# Patient Record
Sex: MALE | Race: WHITE | NOT HISPANIC OR LATINO | Employment: OTHER | URBAN - METROPOLITAN AREA
[De-identification: names, ages, dates, MRNs, and addresses within clinical notes are randomized per-mention and may not be internally consistent; named-entity substitution may affect disease eponyms.]

---

## 2018-12-04 ENCOUNTER — OFFICE VISIT (OUTPATIENT)
Dept: URGENT CARE | Facility: RETAIL CLINIC | Age: 33
End: 2018-12-04
Payer: COMMERCIAL

## 2018-12-04 VITALS
TEMPERATURE: 98 F | SYSTOLIC BLOOD PRESSURE: 145 MMHG | HEART RATE: 66 BPM | DIASTOLIC BLOOD PRESSURE: 74 MMHG | OXYGEN SATURATION: 98 %

## 2018-12-04 DIAGNOSIS — J02.9 ACUTE PHARYNGITIS, UNSPECIFIED ETIOLOGY: Primary | ICD-10-CM

## 2018-12-04 LAB — S PYO AG THROAT QL IA.RAPID: NORMAL

## 2018-12-04 PROCEDURE — 87081 CULTURE SCREEN ONLY: CPT | Performed by: FAMILY MEDICINE

## 2018-12-04 PROCEDURE — 87880 STREP A ASSAY W/OPTIC: CPT | Mod: QW | Performed by: FAMILY MEDICINE

## 2018-12-04 PROCEDURE — 99213 OFFICE O/P EST LOW 20 MIN: CPT | Performed by: FAMILY MEDICINE

## 2018-12-04 NOTE — PROGRESS NOTES
SUBJECTIVE:  Norma Garcia is a 33 year old male with a chief complaint of sore throat.  Onset of symptoms was 3 day(s) ago.    Course of illness: still present.  Severity moderate  Current and Associated symptoms:   Treatment measures tried include Fluids and Rest.  Predisposing factors include None.    Past Medical History:   Diagnosis Date     Psoriasis     glans     Current Outpatient Prescriptions   Medication Sig Dispense Refill     IBU-TAB TABS 200 MG OR prn (Patient not taking: Reported on 12/4/2018)       levothyroxine (SYNTHROID, LEVOTHROID) 125 MCG tablet Take 1 tablet (125 mcg) by mouth daily (Patient not taking: Reported on 12/4/2018) 90 tablet 1     levothyroxine (SYNTHROID, LEVOTHROID) 75 MCG tablet Take 1 tablet by mouth daily. (Patient not taking: Reported on 12/4/2018) 90 tablet 1     History   Smoking Status     Never Smoker   Smokeless Tobacco     Never Used       ROS:  Review of systems negative except as stated above.    OBJECTIVE:   /74  Pulse 66  Temp 98  F (36.7  C) (Oral)  SpO2 98%  GENERAL APPEARANCE: healthy, alert and no distress  EYES: EOMI,  PERRL, conjunctiva clear  HENT: ear canals and TM's normal.  Nose normal.  Pharynx erythematous with some exudate noted.  NECK: supple, non-tender to palpation, no adenopathy noted  RESP: lungs clear to auscultation - no rales, rhonchi or wheezes  CV: regular rates and rhythm, normal S1 S2, no murmur noted  ABDOMEN:  soft, nontender, no HSM or masses and bowel sounds normal  SKIN: no suspicious lesions or rashes    Rapid Strep test is negative; await throat culture results.    ASSESSMENT:  Acute pharyngitis, unspecified etiology    PLAN:   Symptomatic treat with gargles, lozenges, and OTC analgesic as needed.   Follow-up with primary care provider if not improving.

## 2018-12-04 NOTE — MR AVS SNAPSHOT
"              After Visit Summary   2018    Norma Garcia    MRN: [id number]           Patient Information     Date Of Birth          1985        Visit Information        Provider Department      2018 9:00 AM Brian Mckinnon MD [facility]        Today's Diagnoses     Acute pharyngitis, unspecified etiology    -  1       Follow-ups after your visit        Who to contact     You can reach your care team any time of the day by calling [phone].  Notification of test results:  If you have an abnormal lab result, we will notify you by phone as soon as possible.         Additional Information About Your Visit        MyChart Information     [employer] lets you send messages to your doctor, view your test results, renew your prescriptions, schedule appointments and more. To sign up, go to [email or web address] . Click on \"Log in\" on the left side of the screen, which will take you to the Welcome page. Then click on \"Sign up Now\" on the right side of the page.     You will be asked to enter the access code listed below, as well as some personal information. Please follow the directions to create your username and password.     Your access code is: [email or web address]  Expires: 3/4/2019  9:19 AM     Your access code will  in 90 days. If you need help or a new code, please call your [place] clinic or [phone].        Care EveryWhere ID     This is your Care EveryWhere ID. This could be used by other organizations to access your [facility] medical records  [id number]        Your Vitals Were     Pulse Temperature Pulse Oximetry             66 98  F (36.7  C) (Oral) 98%          Blood Pressure from Last 3 Encounters:   18 145/74   13 110/60   13 110/70    Weight from Last 3 Encounters:   13 207 lb (93.9 kg)   13 199 lb (90.3 kg)   13 198 lb (89.8 kg)              We Performed the Following     BETA STREP GROUP A R/O CULTURE     RAPID STREP SCREEN     "    Primary Care Provider Office Phone # Fax #    Jonathan Palma [phone] [address] [address]        Equal Access to Services     LAURIE YODER : Hadii aad ku hadgisel Lopez, wakaitda luqaudra, qaybta kaalmada savita, bernarda buckner odalys peralta. So [facility] [phone].    ATENCIÓN: Si habla español, tiene a peña disposición servicios gratuitos de asistencia lingüística. Llame al [phone].    We comply with applicable federal civil rights laws and [place] laws. We do not discriminate on the basis of race, color, national origin, age, disability, sex, sexual orientation, or gender identity.            Thank you!     Thank you for choosing [facility]  for your care. Our goal is always to provide you with excellent care. Hearing back from our patients is one way we can continue to improve our services. Please take a few minutes to complete the written survey that you may receive in the mail after your visit with us. Thank you!             Your Updated Medication List - Protect others around you: Learn how to safely use, store and throw away your medicines at www.disposemymeds.org.          This list is accurate as of 12/4/18  9:19 AM.  Always use your most recent med list.                   Brand Name Dispense Instructions for use Diagnosis    IBU- MG Tabs      prn        * levothyroxine 75 MCG tablet    SYNTHROID/LEVOTHROID    90 tablet    Take 1 tablet by mouth daily.    Hypothyroidism       * levothyroxine 125 MCG tablet    SYNTHROID/LEVOTHROID    90 tablet    Take 1 tablet (125 mcg) by mouth daily    Hypothyroidism       * Notice:  This list has 2 medication(s) that are the same as other medications prescribed for you. Read the directions carefully, and ask your doctor or other care provider to review them with you.

## 2018-12-06 LAB
BACTERIA SPEC CULT: NORMAL
SPECIMEN SOURCE: NORMAL

## 2019-10-19 ENCOUNTER — APPOINTMENT (OUTPATIENT)
Dept: GENERAL RADIOLOGY | Facility: CLINIC | Age: 34
End: 2019-10-19
Attending: EMERGENCY MEDICINE
Payer: COMMERCIAL

## 2019-10-19 ENCOUNTER — HOSPITAL ENCOUNTER (EMERGENCY)
Facility: CLINIC | Age: 34
Discharge: HOME | End: 2019-10-19
Attending: EMERGENCY MEDICINE | Admitting: EMERGENCY MEDICINE
Payer: COMMERCIAL

## 2019-10-19 VITALS
OXYGEN SATURATION: 97 % | DIASTOLIC BLOOD PRESSURE: 92 MMHG | TEMPERATURE: 96.5 F | RESPIRATION RATE: 17 BRPM | HEART RATE: 81 BPM | SYSTOLIC BLOOD PRESSURE: 146 MMHG

## 2019-10-19 DIAGNOSIS — E03.9 HYPOTHYROIDISM, UNSPECIFIED TYPE: ICD-10-CM

## 2019-10-19 DIAGNOSIS — R00.2 PALPITATIONS: ICD-10-CM

## 2019-10-19 LAB
ANION GAP SERPL CALCULATED.3IONS-SCNC: 8 MMOL/L (ref 3–14)
BASOPHILS # BLD AUTO: 0 10E9/L (ref 0–0.2)
BASOPHILS NFR BLD AUTO: 0.6 %
BUN SERPL-MCNC: 23 MG/DL (ref 7–30)
CALCIUM SERPL-MCNC: 8.4 MG/DL (ref 8.5–10.1)
CHLORIDE SERPL-SCNC: 104 MMOL/L (ref 94–109)
CO2 SERPL-SCNC: 25 MMOL/L (ref 20–32)
CREAT SERPL-MCNC: 1.13 MG/DL (ref 0.66–1.25)
D DIMER PPP FEU-MCNC: 0.4 UG/ML FEU (ref 0–0.5)
DIFFERENTIAL METHOD BLD: NORMAL
EOSINOPHIL NFR BLD AUTO: 3.2 %
ERYTHROCYTE [DISTWIDTH] IN BLOOD BY AUTOMATED COUNT: 12.1 % (ref 10–15)
GFR SERPL CREATININE-BSD FRML MDRD: 85 ML/MIN/{1.73_M2}
GLUCOSE SERPL-MCNC: 163 MG/DL (ref 70–99)
HCT VFR BLD AUTO: 41.1 % (ref 40–53)
HGB BLD-MCNC: 14.1 G/DL (ref 13.3–17.7)
IMM GRANULOCYTES # BLD: 0 10E9/L (ref 0–0.4)
IMM GRANULOCYTES NFR BLD: 0.4 %
LYMPHOCYTES # BLD AUTO: 2 10E9/L (ref 0.8–5.3)
LYMPHOCYTES NFR BLD AUTO: 39.4 %
MCH RBC QN AUTO: 30.7 PG (ref 26.5–33)
MCHC RBC AUTO-ENTMCNC: 34.3 G/DL (ref 31.5–36.5)
MCV RBC AUTO: 89 FL (ref 78–100)
MONOCYTES # BLD AUTO: 0.4 10E9/L (ref 0–1.3)
MONOCYTES NFR BLD AUTO: 7.9 %
NEUTROPHILS # BLD AUTO: 2.5 10E9/L (ref 1.6–8.3)
NEUTROPHILS NFR BLD AUTO: 48.5 %
NRBC # BLD AUTO: 0 10*3/UL
NRBC BLD AUTO-RTO: 0 /100
PLATELET # BLD AUTO: 264 10E9/L (ref 150–450)
POTASSIUM SERPL-SCNC: 3.4 MMOL/L (ref 3.4–5.3)
RBC # BLD AUTO: 4.6 10E12/L (ref 4.4–5.9)
SODIUM SERPL-SCNC: 137 MMOL/L (ref 133–144)
TSH SERPL DL<=0.005 MIU/L-ACNC: 21.88 MU/L (ref 0.4–4)
WBC # BLD AUTO: 5.1 10E9/L (ref 4–11)

## 2019-10-19 PROCEDURE — 80048 BASIC METABOLIC PNL TOTAL CA: CPT | Performed by: EMERGENCY MEDICINE

## 2019-10-19 PROCEDURE — 71046 X-RAY EXAM CHEST 2 VIEWS: CPT | Mod: TC

## 2019-10-19 PROCEDURE — 93010 ELECTROCARDIOGRAM REPORT: CPT | Mod: Z6 | Performed by: EMERGENCY MEDICINE

## 2019-10-19 PROCEDURE — 85025 COMPLETE CBC W/AUTO DIFF WBC: CPT | Performed by: EMERGENCY MEDICINE

## 2019-10-19 PROCEDURE — 99285 EMERGENCY DEPT VISIT HI MDM: CPT | Mod: 25 | Performed by: EMERGENCY MEDICINE

## 2019-10-19 PROCEDURE — 93005 ELECTROCARDIOGRAM TRACING: CPT | Performed by: EMERGENCY MEDICINE

## 2019-10-19 PROCEDURE — 85379 FIBRIN DEGRADATION QUANT: CPT | Performed by: EMERGENCY MEDICINE

## 2019-10-19 PROCEDURE — 84443 ASSAY THYROID STIM HORMONE: CPT | Performed by: EMERGENCY MEDICINE

## 2019-10-19 NOTE — ED AVS SNAPSHOT
[facility] Emergency Department  [address] [place] [address]  Phone:  [phone]  Fax:  [phone]                                    Norma Garcia   MRN: [id number]    Department:  [facility] Emergency Department   Date of Visit:  10/19/2019           After Visit Summary Signature Page    I have received my discharge instructions, and my questions have been answered. I have discussed any challenges I see with this plan with the nurse or doctor.    ..........................................................................................................................................  Patient/Patient Representative Signature      ..........................................................................................................................................  Patient Representative Print Name and Relationship to Patient    ..................................................               ................................................  Date                                   Time    ..........................................................................................................................................  Reviewed by Signature/Title    ...................................................              ..............................................  Date                                               Time          22EPIC Rev 08/18

## 2019-10-19 NOTE — ED TRIAGE NOTES
"Here with shortness of breath and complaints of palpitations. States \"It might be the caffeine\" reports having several cups of coffee this AM. Symptoms started about 45 minutes PTA.  "

## 2019-10-19 NOTE — ED NOTES
PT here with mom.  Pt states that he is generally healthy except he was on thyroid medication some time ago and stopped on his own. Today he noted a feeling that his heart was racing and was SOB.  Never has had this b/4.  Denies any n/v/f/c/diaphoresis.  Denies use of energy drinks.  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

## 2019-10-19 NOTE — ED PROVIDER NOTES
History     Chief Complaint   Patient presents with     Shortness of Breath     HPI  Norma Garcia is a 34 year old male who presents with dyspnea.  This began this morning.  It has been intermittent.  He is also noticed he felt like his heart was racing, he is felt hot and lightheaded at times.  He has had quite a bit of coffee this morning he states.  He had maybe 5 to 6 cups.  Normal day for him is 0 to 3 cups.  He said no fever.  No cough.  No history of heart or lung disease.  With his heart racing he did not check his pulse per se.    Allergies:  Allergies   Allergen Reactions     No Known Drug Allergies        Problem List:    Patient Active Problem List    Diagnosis Date Noted     Hypothyroidism 04/03/2013     Priority: Medium     Psoriasis 03/30/2012     Priority: Medium     CARDIOVASCULAR SCREENING; LDL GOAL LESS THAN 160 10/31/2010     Priority: Medium        Past Medical History:    Past Medical History:   Diagnosis Date     Psoriasis      Thyroid disease        Past Surgical History:    History reviewed. No pertinent surgical history.    Family History:    Family History   Problem Relation Age of Onset     Neurologic Disorder Paternal Grandmother         migraine headaches       Social History:  Marital Status:  Single [1]  Social History     Tobacco Use     Smoking status: Never Smoker     Smokeless tobacco: Never Used   Substance Use Topics     Alcohol use: Yes     Comment: rare     Drug use: No        Medications:    IBU-TAB TABS 200 MG OR  levothyroxine (SYNTHROID, LEVOTHROID) 125 MCG tablet  levothyroxine (SYNTHROID, LEVOTHROID) 75 MCG tablet          Review of Systems  All other systems are reviewed and are negative    Physical Exam   BP: (!) 170/90  Pulse: 81  Heart Rate: 93  Temp: 96.5  F (35.8  C)  Resp: 14  SpO2: 97 %      Physical Exam  Vitals signs and nursing note reviewed.   Constitutional:       General: He is not in acute distress.     Appearance: He is well-developed. He is not  diaphoretic.   HENT:      Head: Normocephalic and atraumatic.   Eyes:      General: No scleral icterus.        Right eye: No discharge.         Left eye: No discharge.      Conjunctiva/sclera: Conjunctivae normal.   Neck:      Musculoskeletal: Normal range of motion and neck supple.   Cardiovascular:      Rate and Rhythm: Normal rate and regular rhythm.      Heart sounds: Normal heart sounds. No murmur.   Pulmonary:      Effort: Pulmonary effort is normal. No respiratory distress.      Breath sounds: Normal breath sounds. No stridor.   Abdominal:      Palpations: Abdomen is soft.      Tenderness: There is no tenderness.   Musculoskeletal: Normal range of motion.   Skin:     General: Skin is warm and dry.      Coloration: Skin is not pale.      Findings: No erythema or rash.   Neurological:      Mental Status: He is alert.      Cranial Nerves: No cranial nerve deficit.      Motor: No abnormal muscle tone.         ED Course        Procedures            EKG: Normal sinus rhythm, normal axis.  Rate of 80 beats per minute.  No acute ST or T wave changes.  Interpreted by myself.      Critical Care time:  none               Results for orders placed or performed during the hospital encounter of 10/19/19 (from the past 24 hour(s))   CBC with platelets differential   Result Value Ref Range    WBC 5.1 4.0 - 11.0 10e9/L    RBC Count 4.60 4.4 - 5.9 10e12/L    Hemoglobin 14.1 13.3 - 17.7 g/dL    Hematocrit 41.1 40.0 - 53.0 %    MCV 89 78 - 100 fl    MCH 30.7 26.5 - 33.0 pg    MCHC 34.3 31.5 - 36.5 g/dL    RDW 12.1 10.0 - 15.0 %    Platelet Count 264 150 - 450 10e9/L    Diff Method Automated Method     % Neutrophils 48.5 %    % Lymphocytes 39.4 %    % Monocytes 7.9 %    % Eosinophils 3.2 %    % Basophils 0.6 %    % Immature Granulocytes 0.4 %    Nucleated RBCs 0 0 /100    Absolute Neutrophil 2.5 1.6 - 8.3 10e9/L    Absolute Lymphocytes 2.0 0.8 - 5.3 10e9/L    Absolute Monocytes 0.4 0.0 - 1.3 10e9/L    Absolute Basophils 0.0 0.0 -  0.2 10e9/L    Abs Immature Granulocytes 0.0 0 - 0.4 10e9/L    Absolute Nucleated RBC 0.0    D dimer quantitative   Result Value Ref Range    D Dimer 0.4 0.0 - 0.50 ug/ml FEU   TSH   Result Value Ref Range    TSH 21.88 (H) 0.40 - 4.00 mU/L   Basic metabolic panel   Result Value Ref Range    Sodium 137 133 - 144 mmol/L    Potassium 3.4 3.4 - 5.3 mmol/L    Chloride 104 94 - 109 mmol/L    Carbon Dioxide 25 20 - 32 mmol/L    Anion Gap 8 3 - 14 mmol/L    Glucose 163 (H) 70 - 99 mg/dL    Urea Nitrogen 23 7 - 30 mg/dL    Creatinine 1.13 0.66 - 1.25 mg/dL    GFR Estimate 85 >60 mL/min/[1.73_m2]    GFR Estimate If Black >90 >60 mL/min/[1.73_m2]    Calcium 8.4 (L) 8.5 - 10.1 mg/dL   XR Chest 2 Views    Narrative    CHEST TWO VIEWS  10/19/2019 2:04 PM     HISTORY: Shortness of breath.    COMPARISON: Chest x-ray 8/26/2010.      Impression    IMPRESSION: PA and lateral views of the chest. Lungs are clear. Heart  is normal in size. No effusions are evident. No pneumothorax.    JOEL BARNES MD       Medications - No data to display    Assessments & Plan (with Medical Decision Making)  34-year-old male who presented with palpitations and dyspnea after taking an approximately 5 to 6 cups of coffee which is a large amount for him.  Work-up for this as above was benign.  Incidental finding of elevated TSH.  Previous elevated TSH in the range of 8 but has not been taking his medications.  Have referred him back to his primary physician and reinforced treating this would be highly recommended.     I have reviewed the nursing notes.    I have reviewed the findings, diagnosis, plan and need for follow up with the patient.       New Prescriptions    No medications on file       Final diagnoses:   Palpitations   Hypothyroidism, unspecified type       10/19/2019   [facility] EMERGENCY DEPARTMENT     Pepe Bates MD  10/19/19 [id number]

## 2019-10-23 NOTE — PROGRESS NOTES
"Subjective     Norma Garcia is a 34 year old male who presents to clinic today for the following health issues:    History of Present Illness        Hypothyroidism:     Since last visit, patient describes the following symptoms::  Dry skin    Migraines:   Since the patient's last clinic visit, headaches are: no change  The patient is getting headaches:  1  He is able to do normal daily activities when he has a migraine.  The patient is taking the following rescue/relief medications:  Ibuprofen (Advil, Motrin)   Patient states \"I get total relief\" from the rescue/relief medications.   The patient is taking the following medications to prevent migraines:  No medications to prevent migraines  In the past 4 weeks, the patient has gone to an Urgent Care or Emergency Room 0 times times due to headaches.    He eats 0-1 servings of fruits and vegetables daily.He consumes 0 sweetened beverage(s) daily.     ED/UC Followup:    Facility:  [facility]  Date of visit: 10/19/19  Reason for visit: palpitations, shortness of breath  Current Status: still having shortness of breath    Patient presents today after being seen in the ER on 10/19/2019. At that time he presented with shortness of breath and heart racing. He did drink maybe 5-6 cups of coffee that day when he normally has just a couple. Lab evaluation done showing a TSH of 21.88. D-dimer was negative and chest xray normal.     Patient reports he was diagnosed with hypothyroidism in 2013. At that time his TSH was 8. He was started on Synthroid. Stopped this after some time and has not been seen since. He reports shortness of breath is still persisting. He reports especially worse when he is talking a lot or when he is working. He reports symptoms do improve with rest or with taking a drink of water. He reports he has not had any heart palpitations since the ER visit. No symptoms of shortness of breath at night. Reports very hard to describe the shortness of breath. He is a " " for farm equipment - very physical job. Thinks maybe mother has thyroid problems.      Patient Active Problem List   Diagnosis     CARDIOVASCULAR SCREENING; LDL GOAL LESS THAN 160     Psoriasis     Hypothyroidism     History reviewed. No pertinent surgical history.    Social History     Tobacco Use     Smoking status: Never Smoker     Smokeless tobacco: Never Used   Substance Use Topics     Alcohol use: Yes     Comment: rare     Family History   Problem Relation Age of Onset     Neurologic Disorder Paternal Grandmother         migraine headaches     Diabetes Maternal Grandmother          Current Outpatient Medications   Medication Sig Dispense Refill     levothyroxine (SYNTHROID/LEVOTHROID) 125 MCG tablet Take 1 tablet (125 mcg) by mouth daily 90 tablet 0     Allergies   Allergen Reactions     No Known Drug Allergies      BP Readings from Last 3 Encounters:   10/25/19 106/70   10/19/19 (!) 146/92   12/04/18 145/74    Wt Readings from Last 3 Encounters:   10/25/19 102.5 kg (226 lb)   08/30/13 93.9 kg (207 lb)   06/03/13 90.3 kg (199 lb)        Reviewed and updated as needed this visit by Provider  Tobacco  Allergies  Meds  Problems  Med Hx  Surg Hx  Fam Hx  Soc Hx          Review of Systems   ROS COMP: Constitutional, HEENT, cardiovascular, pulmonary, GI, , musculoskeletal, neuro, skin, endocrine and psych systems are negative, except as otherwise noted.      Objective    /70   Pulse 60   Temp 98.1  F (36.7  C) (Temporal)   Resp 16   Ht 1.899 m (6' 2.75\")   Wt 102.5 kg (226 lb)   SpO2 99%   BMI 28.44 kg/m    Body mass index is 28.44 kg/m .  Physical Exam   GENERAL: healthy, alert and no distress  EYES: Eyes grossly normal to inspection, PERRL and conjunctivae and sclerae normal  HENT: ear canals and TM's normal, nose and mouth without ulcers or lesions  NECK: no adenopathy, no asymmetry, masses, or scars and thyroid normal to palpation  RESP: lungs clear to auscultation - no rales, " rhonchi or wheezes  CV: regular rate and rhythm, normal S1 S2, no S3 or S4, no murmur, click or rub, no peripheral edema and peripheral pulses strong  ABDOMEN: soft, nontender, no hepatosplenomegaly, no masses and bowel sounds normal  SKIN: no suspicious lesions or rashes  PSYCH: mentation appears normal, affect normal/bright    Diagnostic Test Results:  Labs reviewed in Epic  none         Assessment & Plan     1. Hypothyroidism, unspecified type  Discussed with patient that not uncommon for thyroid symptoms to produce heart palpitations, shortness of breath among other symptoms. Patient was last on a dose of 125 mcg so will restart this today. Discussed appropriate timing of taking the medication and importance of taking this daily. Will have patient follow-up in 4-6 weeks for a lab only appointment, sooner if needed.   - levothyroxine (SYNTHROID/LEVOTHROID) 125 MCG tablet; Take 1 tablet (125 mcg) by mouth daily  Dispense: 90 tablet; Refill: 0  - TSH with free T4 reflex; Future     The patient indicates understanding of these issues and agrees with the plan.    Carolyn Dudley PA-C  [facility]

## 2019-10-25 ENCOUNTER — OFFICE VISIT (OUTPATIENT)
Dept: FAMILY MEDICINE | Facility: OTHER | Age: 34
End: 2019-10-25
Payer: COMMERCIAL

## 2019-10-25 VITALS
SYSTOLIC BLOOD PRESSURE: 106 MMHG | WEIGHT: 226 LBS | RESPIRATION RATE: 16 BRPM | BODY MASS INDEX: 28.1 KG/M2 | DIASTOLIC BLOOD PRESSURE: 70 MMHG | HEIGHT: 75 IN | HEART RATE: 60 BPM | OXYGEN SATURATION: 99 % | TEMPERATURE: 98.1 F

## 2019-10-25 DIAGNOSIS — E03.9 HYPOTHYROIDISM, UNSPECIFIED TYPE: ICD-10-CM

## 2019-10-25 PROCEDURE — 99203 OFFICE O/P NEW LOW 30 MIN: CPT | Performed by: PHYSICIAN ASSISTANT

## 2019-10-25 RX ORDER — LEVOTHYROXINE SODIUM 125 UG/1
125 TABLET ORAL DAILY
Qty: 90 TABLET | Refills: 0 | Status: SHIPPED | OUTPATIENT
Start: 2019-10-25 | End: 2019-12-30 | Stop reason: SINTOL

## 2019-10-25 ASSESSMENT — MIFFLIN-ST. JEOR: SCORE: 2056.79

## 2019-10-25 NOTE — PATIENT INSTRUCTIONS
Patient Education     Hypothyroidism    You have hypothyroidism. This means your thyroid gland is not making enough thyroid hormone. This hormone is vital to body growth and metabolism. If you don t make enough, many body processes slow down. This can cause symptoms throughout the body. Hypothyroidism can range from mild to severe. The most severe form is called myxedema.  There are a number of causes of hypothyroidism. A common cause is Hashimoto s disease. This disease causes the body s own immune system to attack the thyroid gland. When you have certain treatments, such as surgery to remove the thyroid gland, this can also cause hypothyroidism. Sometimes the thyroid gland is not functioning because of lack of stimulation from the pituitary gland.  Symptoms of hypothyroidism can include:    Fatigue    Trouble concentrating or thinking clearly; forgetfulness    Dry skin    Hair loss    Weight gain    Low tolerance to cold    Constipation    Depression    Personality changes    Tingling or prickling of the hands or feet    Heavy, absent, or irregular periods (women only)  Older adults may sometimes have other symptoms. These can include:    Muscle aches and weakness    Confusion    Incontinence (unable to control urine or stool)    Trouble moving around    Falling  Treatment for hypothyroidism involves taking thyroid hormone pills daily. These pills replace the hormone your thyroid doesn t make. You will likely need to take a daily pill for the rest of your life. Tips for taking this medicine are given below.  Home care  Tips for taking your medicine    Take your thyroid hormone pills as prescribed by your healthcare provider. This is most often 1 pill a day on an empty stomach. Use a pillbox labeled with the days of the week. This will help you remember to take your pill each day.    Don t take products that contain iron and calcium or antacids within 4 hours of taking your thyroid hormone pills.    Don t take  other medicines with your thyroid hormone pill without checking with your provider first.    Tell your provider if you have any side effects from your medicines that bother you, especially any chest pain or irregular heartbeats.    Never change the dosage or stop taking your thyroid pills without talking to your provider first.  General care    Always talk with your provider before trying other medicines or treatments for your thyroid problem.    If you see other healthcare providers, be sure to let them know about your thyroid problem.    Let your healthcare provider know if you become pregnant because your dose of thyroid hormone will need to be adjusted.  Follow-up care  See your healthcare provider for checkups as advised. You may need regular tests to check the level of thyroid hormone in your blood.  When to seek medical advice  Call your healthcare provider right away if any of these occur:    New symptoms develop    Symptoms return, continue, or worsen even after treatment    Extreme fatigue    Puffy hands, face, or feet    Fast or irregular heartbeat    Confusion  Call 911  Call 911 if any of these occur:    Fainting    Chest pain    Shortness of breath or trouble breathing  Date Last Reviewed: 4/1/2018 2000-2018 [employer]. [address] [place] [address], [place] [address]. All rights reserved. This information is not intended as a substitute for professional medical care. Always follow your healthcare professional's instructions.

## 2019-11-25 DIAGNOSIS — E03.9 HYPOTHYROIDISM, UNSPECIFIED TYPE: ICD-10-CM

## 2019-11-25 LAB — TSH SERPL DL<=0.005 MIU/L-ACNC: 2.57 MU/L (ref 0.4–4)

## 2019-11-25 PROCEDURE — 36415 COLL VENOUS BLD VENIPUNCTURE: CPT | Performed by: PHYSICIAN ASSISTANT

## 2019-11-25 PROCEDURE — 84443 ASSAY THYROID STIM HORMONE: CPT | Performed by: PHYSICIAN ASSISTANT

## 2019-11-25 NOTE — RESULT ENCOUNTER NOTE
Please notify patient that all labs were normal. Continue same dose of Synthroid. Recheck in 6 months.     Carolyn Dudley PA-C

## 2019-12-03 ENCOUNTER — OFFICE VISIT (OUTPATIENT)
Dept: FAMILY MEDICINE | Facility: CLINIC | Age: 34
End: 2019-12-03
Payer: COMMERCIAL

## 2019-12-03 VITALS
WEIGHT: 227.3 LBS | TEMPERATURE: 98.2 F | HEART RATE: 72 BPM | DIASTOLIC BLOOD PRESSURE: 78 MMHG | SYSTOLIC BLOOD PRESSURE: 136 MMHG | OXYGEN SATURATION: 98 % | RESPIRATION RATE: 12 BRPM | BODY MASS INDEX: 28.6 KG/M2

## 2019-12-03 DIAGNOSIS — F41.9 ANXIETY: ICD-10-CM

## 2019-12-03 DIAGNOSIS — R07.89 CHEST TIGHTNESS: ICD-10-CM

## 2019-12-03 DIAGNOSIS — R53.83 FATIGUE, UNSPECIFIED TYPE: ICD-10-CM

## 2019-12-03 DIAGNOSIS — E03.9 HYPOTHYROIDISM, UNSPECIFIED TYPE: Primary | ICD-10-CM

## 2019-12-03 LAB
HBA1C MFR BLD: 5.3 % (ref 0–5.6)
T4 FREE SERPL-MCNC: 0.84 NG/DL (ref 0.76–1.46)
TSH SERPL DL<=0.005 MIU/L-ACNC: 4.32 MU/L (ref 0.4–4)

## 2019-12-03 PROCEDURE — 99214 OFFICE O/P EST MOD 30 MIN: CPT | Performed by: PHYSICIAN ASSISTANT

## 2019-12-03 PROCEDURE — 36415 COLL VENOUS BLD VENIPUNCTURE: CPT | Performed by: PHYSICIAN ASSISTANT

## 2019-12-03 PROCEDURE — 84443 ASSAY THYROID STIM HORMONE: CPT | Performed by: PHYSICIAN ASSISTANT

## 2019-12-03 PROCEDURE — 83036 HEMOGLOBIN GLYCOSYLATED A1C: CPT | Performed by: PHYSICIAN ASSISTANT

## 2019-12-03 PROCEDURE — 84439 ASSAY OF FREE THYROXINE: CPT | Performed by: PHYSICIAN ASSISTANT

## 2019-12-03 RX ORDER — HYDROXYZINE PAMOATE 25 MG/1
25 CAPSULE ORAL 3 TIMES DAILY PRN
Qty: 60 CAPSULE | Refills: 0 | Status: SHIPPED | OUTPATIENT
Start: 2019-12-03 | End: 2020-01-04

## 2019-12-03 ASSESSMENT — PAIN SCALES - GENERAL: PAINLEVEL: NO PAIN (0)

## 2019-12-03 NOTE — PROGRESS NOTES
Subjective     Norma Garcia is a 34 year old male who presents to clinic today for the following health issues:    HPI   ED/UC Followup:    Facility:  [facility] Emergency room  Date of visit: 10-  Reason for visit: breathing issues, dizzy, heart racing  Current Status: slight relief       Patient is a 34 year old male who presents today with complaints of SOB and persistent dizziness. Thierry was seen in [place] on 10/25 and treated for hypothyroidism. We will recheck this lab today. Patient describes his symptoms as random parts of his body feel off for brief episodes without consistency for time or body part. He identifies SOB, chest tightness, shakiness, head buzzing, and at night he wakes with a choking sensation. Denies nausea, vomiting, change in vision/hearing, LOC, headache, numbness or weakness, recent trauma or illness. He denies new stressors in his life, but says that he has had some anxiety off/on throughout his life.       Reviewed and updated as needed this visit by Provider         Review of Systems   ROS COMP: Constitutional, HEENT, cardiovascular, pulmonary, gi and gu systems are negative, except as otherwise noted.      Objective    /78 (BP Location: Right arm, Patient Position: Chair, Cuff Size: Adult Large)   Pulse 72   Temp 98.2  F (36.8  C) (Oral)   Resp 12   Wt 103.1 kg (227 lb 4.8 oz)   SpO2 98%   BMI 28.60 kg/m    Body mass index is 28.6 kg/m .  Physical Exam   GENERAL: healthy, alert and no distress  EYES: Eyes grossly normal to inspection, PERRL and conjunctivae and sclerae normal  HENT: ear canals and TM's normal, nose and mouth without ulcers or lesions  NECK: no adenopathy, no asymmetry, masses, or scars and thyroid normal to palpation  RESP: lungs clear to auscultation - no rales, rhonchi or wheezes  CV: regular rate and rhythm, normal S1 S2, no S3 or S4, no murmur, click or rub, no peripheral edema and peripheral pulses strong  MS: no gross musculoskeletal  defects noted, no edema  NEURO: Normal strength and tone, mentation intact and speech normal  PSYCH: mentation appears normal, affect normal/bright    Diagnostic Test Results:  Results for orders placed or performed in visit on 12/03/19   TSH with free T4 reflex     Status: Abnormal   Result Value Ref Range    TSH 4.32 (H) 0.40 - 4.00 mU/L   Hemoglobin A1c     Status: None   Result Value Ref Range    Hemoglobin A1C 5.3 0 - 5.6 %   T4 free     Status: None   Result Value Ref Range    T4 Free 0.84 0.76 - 1.46 ng/dL           Assessment & Plan       ICD-10-CM    1. Hypothyroidism, unspecified type E03.9 TSH with free T4 reflex   2. Fatigue, unspecified type R53.83 hydrOXYzine (VISTARIL) 25 MG capsule     Hemoglobin A1c     T4 free     T4 free   3. Chest tightness R07.89    4. Anxiety F41.9        Patient admits to having been only taking half tablets of his levothyroxine. I will have him resume full tablets and recheck levels in 1 month. It is possible that he was getting too much and becoming hyperthyroid. He will have hydroxyzine to use as needed during subsequent episodes. If this is effective consider ssri or counseling.       Return in about 1 month (around 1/3/2020) for Lab Work/Review - TSH.    Bill Whitlock PA-C  [facility]

## 2019-12-03 NOTE — PATIENT INSTRUCTIONS
Patient Education     Anxiety Reaction  Anxiety is the feeling we all get when we think something bad might happen. It is a normal response to stress and usually causes only a mild reaction. When anxiety becomes more severe, it can interfere with daily life. In some cases, you may not even be aware of what it is you re anxious about. There may also be a genetic link or it may be a learned behavior in the home.  Both psychological and physical triggers cause stress reaction. It's often a response to fear or emotional stress, real or imagined. This stress may come from home, family, work, or social relationships.  During an anxiety reaction, you may feel:    Helpless    Nervous    Depressed    Irritable  Your body may show signs of anxiety in many ways. You may experience:    Dry mouth    Shakiness    Dizziness    Weakness    Trouble breathing    Breathing fast (hyperventilating)    Chest pressure    Sweating    Headache    Nausea    Diarrhea    Tiredness    Inability to sleep    Sexual problems  Home care    Try to locate the sources of stress in your life. They may not be obvious. These may include:  ? Daily hassles of life (such as traffic jams, missed appointments, or car troubles)  ? Major life changes, both good (new baby or job promotion) and bad (loss of job or loss of loved one)  ? Overload: feeling that you have too many responsibilities and can't take care of all of them at once  ? Feeling helpless or feeling that your problems are beyond what you re able to solve    Notice how your body reacts to stress. Learn to listen to your body signals. This will help you take action before the stress becomes severe.    When you can, do something about the source of your stress. (Avoid hassles, limit the amount of change that happens in your life at one time and take a break when you feel overloaded).    Unfortunately, many stressful situations can't be avoided. It is necessary to learn how to better manage stress.  There are many proven methods that will reduce your anxiety. These include simple things like exercise, good nutrition, and adequate rest. Also, there are certain techniques that are helpful:  ? Relaxation  ? Breathing exercises  ? Visualization  ? Biofeedback  ? Meditation  For more information about this, consult your healthcare provider or go to a local bookstore and review the many books and tapes available on this subject.  Follow-up care  If you feel that your anxiety is not responding to self-help measures, contact your healthcare provider or make an appointment with a counselor. You may need short-term psychological counseling and temporary medicine to help you manage stress.  Call 911  Call 911 if any of these happen:    Trouble breathing    Confusion    Drowsiness or trouble wakening    Fainting or loss of consciousness    Rapid heart rate    Seizure    New chest pain that becomes more severe, lasts longer, or spreads into your shoulder, arm, neck, jaw, or back  When to seek medical advice  Call your healthcare provider right away if any of these happen:    Your symptoms get worse    Severe headache not relieved by rest and mild pain reliever  Date Last Reviewed: 10/1/2017    3489-5889 [employer]. [address] [place] [address], [place] [address]. All rights reserved. This information is not intended as a substitute for professional medical care. Always follow your healthcare professional's instructions.

## 2019-12-03 NOTE — NURSING NOTE
Health Maintenance Due   Topic Date Due     PREVENTIVE CARE VISIT  03/16/2008     DTAP/TDAP/TD IMMUNIZATION (8 - Td) 11/04/2019     America MARSH LPN

## 2019-12-05 ENCOUNTER — TELEPHONE (OUTPATIENT)
Dept: FAMILY MEDICINE | Facility: OTHER | Age: 34
End: 2019-12-05

## 2019-12-05 ENCOUNTER — TELEPHONE (OUTPATIENT)
Dept: FAMILY MEDICINE | Facility: CLINIC | Age: 34
End: 2019-12-05

## 2019-12-05 DIAGNOSIS — E03.9 HYPOTHYROIDISM, UNSPECIFIED TYPE: ICD-10-CM

## 2019-12-05 NOTE — TELEPHONE ENCOUNTER
----- Message from Bill Whitlock PA-C sent at 12/5/2019  9:35 AM CST -----  Please call with results. Please inform patient that his thyroid stimulating hormone returned slightly elevated which may be a normal variant for him or could mean that he needs a small increase in his levothyroxine. I recall that he said he had only been taking half tablets, please confirm that this was correct. If so, he should return to taking full tablets and we can repeat the TSH in 1 month.      Bill Whitlock PA-C on 12/5/2019 at 9:32 AM

## 2019-12-05 NOTE — TELEPHONE ENCOUNTER
Okay to stay on 1/2 tablet until PCP can address upon his return.   Electronically signed by Yovana Briseno CNP.

## 2019-12-05 NOTE — TELEPHONE ENCOUNTER
I called the patient with the following per Bill Whitlock PA-C:  Please call with results. Please inform patient that his thyroid stimulating hormone returned slightly elevated which may be a normal variant for him or could mean that he needs a small increase in his levothyroxine. I recall that he said he had only been taking half tablets, please confirm that this was correct. If so, he should return to taking full tablets and we can repeat the TSH in 1 month.     The patient stated he has been taking a 1/2 tablet of the synthroid. He stated he will do the recommended dose of 1 tablet and rechecking labs in 1 month.    He asked about his HGB A1C.  I talked with Yovana Briseno CNP and she stated it is normal.  The patient was informed of this.

## 2019-12-05 NOTE — TELEPHONE ENCOUNTER
I called this patient with the following per Yovana Briseno CNP:  Okay to stay on 1/2 tablet until PCP can address upon his return.   Electronically signed by Yovana Briseno CNP.

## 2019-12-05 NOTE — TELEPHONE ENCOUNTER
Reason for Call:  Other prescription    Detailed comments: Pt was given his lab results. Pt stated when he takes a full tablet he is not able to sleep. Can pt be prescribed a different medication? Is it OK for pt to continue taking a half tablet until JR returns on Mon or should another Provider address today?    Phone Number Patient can be reached at: Home number on file [phone] (home)    Best Time:     Can we leave a detailed message on this number? YES    Call taken on 12/5/2019 at 11:02 AM by Katlyn Pickens

## 2019-12-09 NOTE — TELEPHONE ENCOUNTER
I called the patient with the following per Bill ang PA-C:  Please call patient to inform him that I would like him to try taking a half tablet twice daily. He may take the evening tablet with meals if he finds that he cannot sleep as this can decrease absorption. I would like to recheck the TSH/T4 levels 1 month following this.

## 2019-12-09 NOTE — TELEPHONE ENCOUNTER
Please call patient to inform him that I would like him to try taking a half tablet twice daily. He may take the evening tablet with meals if he finds that he cannot sleep as this can decrease absorption. I would like to recheck the TSH/T4 levels 1 month following this.     Bill Whitlock PA-C on 12/9/2019 at 7:34 AM

## 2019-12-30 ENCOUNTER — OFFICE VISIT (OUTPATIENT)
Dept: FAMILY MEDICINE | Facility: CLINIC | Age: 34
End: 2019-12-30
Payer: COMMERCIAL

## 2019-12-30 VITALS
TEMPERATURE: 97.5 F | HEIGHT: 75 IN | OXYGEN SATURATION: 97 % | HEART RATE: 81 BPM | WEIGHT: 224.1 LBS | SYSTOLIC BLOOD PRESSURE: 116 MMHG | DIASTOLIC BLOOD PRESSURE: 78 MMHG | RESPIRATION RATE: 16 BRPM | BODY MASS INDEX: 27.86 KG/M2

## 2019-12-30 DIAGNOSIS — R00.2 PALPITATIONS: ICD-10-CM

## 2019-12-30 DIAGNOSIS — E03.9 HYPOTHYROIDISM, UNSPECIFIED TYPE: Primary | ICD-10-CM

## 2019-12-30 PROCEDURE — 99214 OFFICE O/P EST MOD 30 MIN: CPT | Performed by: FAMILY MEDICINE

## 2019-12-30 RX ORDER — LEVOTHYROXINE SODIUM 75 UG/1
75 TABLET ORAL DAILY
Qty: 30 TABLET | Refills: 3 | Status: SHIPPED | OUTPATIENT
Start: 2019-12-30 | End: 2020-01-04

## 2019-12-30 ASSESSMENT — MIFFLIN-ST. JEOR: SCORE: 2048.17

## 2019-12-30 NOTE — PROGRESS NOTES
"Subjective     Norma Garcia is a 34 year old male who presents to clinic today for the following health issues:    HPI   Hypertension Follow-up      Do you check your blood pressure regularly outside of the clinic? { :940629}     Are you following a low salt diet? { :826044}    Are your blood pressures ever more than 140 on the top number (systolic) OR more   than 90 on the bottom number (diastolic), for example 140/90? { :617552}      How many servings of fruits and vegetables do you eat daily?  { :978586}    On average, how many sweetened beverages do you drink each day (Examples: soda, juice, sweet tea, etc.  Do NOT count diet or artificially sweetened beverages)?   { 1-11:543531}    How many days per week do you miss taking your medication? {0-7 :238277}    {additonal problems for provider to add (Optional):877818}    {HIST REVIEW/ LINKS 2 (Optional):281554}    Reviewed and updated as needed this visit by Provider         Review of Systems   {ROS COMP (Optional):472091}      Objective    There were no vitals taken for this visit.  There is no height or weight on file to calculate BMI.  Physical Exam   {Exam List (Optional):155162}    {Diagnostic Test Results (Optional):013196::\"Diagnostic Test Results:\",\"Labs reviewed in Epic\"}        {PROVIDER CHARTING PREFERENCE:432088}    "

## 2019-12-30 NOTE — PROGRESS NOTES
Subjective     Norma Garcia is a 34 year old male who presents to clinic today for the following health issues:    HPI     Patient thinks he is having problems with his new dosing of thyroid medication. He states that if he takes a while tablet he gets the jitters and feels like his heart is racing.       Hypothyroidism Follow-up      Since last visit, patient describes the following symptoms: Weight stable, no hair loss, no skin changes, no constipation, no loose stools, dry skin, tremors, anxiety and fatigue      How many servings of fruits and vegetables do you eat daily?  0-1    On average, how many sweetened beverages do you drink each day (Examples: soda, juice, sweet tea, etc.  Do NOT count diet or artificially sweetened beverages)?   0    How many days per week do you miss taking your medication? 0    SUBJECTIVE:  Norma  is a 34 year old male who presents for: Up of his hypothyroidism.  He was seen earlier in the month and it announced that he only been taking half of his Synthroid for 1 he gets the jitters.  His TSH was borderline at 4.3.  But he has had episodes when he gets palpitations.  He is concerned about this.  He can feel them.  Denies excessive use of caffeine or energy drinks.    Past Medical History:   Diagnosis Date     Psoriasis     glans     Thyroid disease      History reviewed. No pertinent surgical history.  Social History     Tobacco Use     Smoking status: Never Smoker     Smokeless tobacco: Never Used   Substance Use Topics     Alcohol use: Yes     Comment: rare     Current Outpatient Medications   Medication Sig Dispense Refill     hydrOXYzine (VISTARIL) 25 MG capsule Take 1 capsule (25 mg) by mouth 3 times daily as needed for anxiety 60 capsule 0     levothyroxine (SYNTHROID/LEVOTHROID) 75 MCG tablet Take 1 tablet (75 mcg) by mouth daily 30 tablet 3       REVIEW OF SYSTEMS:   5 point ROS negative except as noted above in HPI, including Gen., Resp, CV, GI &  system review.  "    OBJECTIVE:  Vitals: /78 (BP Location: Left arm, Patient Position: Sitting, Cuff Size: Adult Large)   Pulse 81   Temp 97.5  F (36.4  C) (Temporal)   Resp 16   Ht 1.899 m (6' 2.75\")   Wt 101.7 kg (224 lb 1.6 oz)   SpO2 97%   BMI 28.20 kg/m    BMI= Body mass index is 28.2 kg/m .  He is alert appears well.  Eyes are normal.  Neck supple no thyromegaly.  Lungs are clear.  Heart regular rhythm rate 80s.  Skin normal.  DTRs are 2/5.  Last TSH is 4.32.    ASSESSMENT:  #1 hypothyroidism #2 palpitations    PLAN:  He was prescribed Synthroid 125 and is taking half.  We have agreed to try 75 mcg.  So this was sent in for him.  We will recheck a thyroid in a couple of months.  Also is getting set up for ZIO patch for a 2-week period to monitor his heart rate.  Will notify him with results of this.        Jonathan Palma MD  [facility], [place]            "

## 2020-01-02 ENCOUNTER — HOSPITAL ENCOUNTER (OUTPATIENT)
Dept: CARDIOLOGY | Facility: CLINIC | Age: 35
Discharge: HOME | End: 2020-01-02
Attending: FAMILY MEDICINE | Admitting: FAMILY MEDICINE
Payer: COMMERCIAL

## 2020-01-02 DIAGNOSIS — R00.2 PALPITATIONS: ICD-10-CM

## 2020-01-02 PROCEDURE — 0296T ZIO PATCH HOLTER ADULT PEDIATRIC GREATER THAN 48 HRS: CPT

## 2020-01-04 ENCOUNTER — HOSPITAL ENCOUNTER (EMERGENCY)
Facility: CLINIC | Age: 35
Discharge: HOME | End: 2020-01-04
Attending: FAMILY MEDICINE | Admitting: FAMILY MEDICINE
Payer: COMMERCIAL

## 2020-01-04 VITALS
BODY MASS INDEX: 28.31 KG/M2 | WEIGHT: 225 LBS | SYSTOLIC BLOOD PRESSURE: 127 MMHG | RESPIRATION RATE: 14 BRPM | HEART RATE: 78 BPM | TEMPERATURE: 98.5 F | OXYGEN SATURATION: 98 % | DIASTOLIC BLOOD PRESSURE: 81 MMHG

## 2020-01-04 DIAGNOSIS — E03.8 SUBCLINICAL HYPOTHYROIDISM: ICD-10-CM

## 2020-01-04 DIAGNOSIS — R55 VASOVAGAL SYNDROME: ICD-10-CM

## 2020-01-04 LAB
ANION GAP SERPL CALCULATED.3IONS-SCNC: 6 MMOL/L (ref 3–14)
BASOPHILS # BLD AUTO: 0 10E9/L (ref 0–0.2)
BASOPHILS NFR BLD AUTO: 0.7 %
BUN SERPL-MCNC: 18 MG/DL (ref 7–30)
CALCIUM SERPL-MCNC: 9.2 MG/DL (ref 8.5–10.1)
CHLORIDE SERPL-SCNC: 105 MMOL/L (ref 94–109)
CO2 SERPL-SCNC: 28 MMOL/L (ref 20–32)
CREAT SERPL-MCNC: 1.17 MG/DL (ref 0.66–1.25)
DIFFERENTIAL METHOD BLD: NORMAL
EOSINOPHIL NFR BLD AUTO: 1.5 %
ERYTHROCYTE [DISTWIDTH] IN BLOOD BY AUTOMATED COUNT: 11.9 % (ref 10–15)
GFR SERPL CREATININE-BSD FRML MDRD: 82 ML/MIN/{1.73_M2}
GLUCOSE SERPL-MCNC: 111 MG/DL (ref 70–99)
HCT VFR BLD AUTO: 46.5 % (ref 40–53)
HGB BLD-MCNC: 15.5 G/DL (ref 13.3–17.7)
IMM GRANULOCYTES # BLD: 0 10E9/L (ref 0–0.4)
IMM GRANULOCYTES NFR BLD: 0.2 %
LYMPHOCYTES # BLD AUTO: 1.8 10E9/L (ref 0.8–5.3)
LYMPHOCYTES NFR BLD AUTO: 30.1 %
MCH RBC QN AUTO: 30 PG (ref 26.5–33)
MCHC RBC AUTO-ENTMCNC: 33.3 G/DL (ref 31.5–36.5)
MCV RBC AUTO: 90 FL (ref 78–100)
MONOCYTES # BLD AUTO: 0.5 10E9/L (ref 0–1.3)
MONOCYTES NFR BLD AUTO: 8.2 %
NEUTROPHILS # BLD AUTO: 3.5 10E9/L (ref 1.6–8.3)
NEUTROPHILS NFR BLD AUTO: 59.3 %
NRBC # BLD AUTO: 0 10*3/UL
NRBC BLD AUTO-RTO: 0 /100
PLATELET # BLD AUTO: 346 10E9/L (ref 150–450)
POTASSIUM SERPL-SCNC: 3.8 MMOL/L (ref 3.4–5.3)
RBC # BLD AUTO: 5.17 10E12/L (ref 4.4–5.9)
SODIUM SERPL-SCNC: 139 MMOL/L (ref 133–144)
T4 FREE SERPL-MCNC: 0.93 NG/DL (ref 0.76–1.46)
TROPONIN I SERPL-MCNC: <0.015 UG/L (ref 0–0.04)
TSH SERPL DL<=0.005 MIU/L-ACNC: 4.08 MU/L (ref 0.4–4)
WBC # BLD AUTO: 5.8 10E9/L (ref 4–11)

## 2020-01-04 PROCEDURE — 99283 EMERGENCY DEPT VISIT LOW MDM: CPT | Mod: 25 | Performed by: FAMILY MEDICINE

## 2020-01-04 PROCEDURE — 25800030 ZZH RX IP 258 OP 636: Performed by: FAMILY MEDICINE

## 2020-01-04 PROCEDURE — 84443 ASSAY THYROID STIM HORMONE: CPT | Performed by: FAMILY MEDICINE

## 2020-01-04 PROCEDURE — 99284 EMERGENCY DEPT VISIT MOD MDM: CPT | Mod: Z6 | Performed by: FAMILY MEDICINE

## 2020-01-04 PROCEDURE — 84439 ASSAY OF FREE THYROXINE: CPT | Performed by: FAMILY MEDICINE

## 2020-01-04 PROCEDURE — 80048 BASIC METABOLIC PNL TOTAL CA: CPT | Performed by: FAMILY MEDICINE

## 2020-01-04 PROCEDURE — 96360 HYDRATION IV INFUSION INIT: CPT | Performed by: FAMILY MEDICINE

## 2020-01-04 PROCEDURE — 85025 COMPLETE CBC W/AUTO DIFF WBC: CPT | Performed by: FAMILY MEDICINE

## 2020-01-04 PROCEDURE — 84484 ASSAY OF TROPONIN QUANT: CPT | Performed by: FAMILY MEDICINE

## 2020-01-04 RX ORDER — SODIUM CHLORIDE 9 MG/ML
1000 INJECTION, SOLUTION INTRAVENOUS CONTINUOUS
Status: DISCONTINUED | OUTPATIENT
Start: 2020-01-04 | End: 2020-01-04 | Stop reason: CLARIF

## 2020-01-04 RX ORDER — LEVOTHYROXINE SODIUM 88 UG/1
88 TABLET ORAL DAILY
Qty: 30 TABLET | Refills: 0 | Status: SHIPPED | OUTPATIENT
Start: 2020-01-04 | End: 2020-01-29

## 2020-01-04 RX ORDER — LORAZEPAM 2 MG/ML
0.5 INJECTION INTRAMUSCULAR ONCE
Status: DISCONTINUED | OUTPATIENT
Start: 2020-01-04 | End: 2020-01-04 | Stop reason: HOSPADM

## 2020-01-04 RX ADMIN — SODIUM CHLORIDE 1000 ML: 9 INJECTION, SOLUTION INTRAVENOUS at 11:55

## 2020-01-04 NOTE — ED AVS SNAPSHOT
[facility] Emergency Department  [address]   [place] [address]  Phone:  [phone]  Fax:  [phone]                                    Norma Garcia   MRN: [id number]    Department:  [facility] Emergency Department   Date of Visit:  1/4/2020           After Visit Summary Signature Page    I have received my discharge instructions, and my questions have been answered. I have discussed any challenges I see with this plan with the nurse or doctor.    ..........................................................................................................................................  Patient/Patient Representative Signature      ..........................................................................................................................................  Patient Representative Print Name and Relationship to Patient    ..................................................               ................................................  Date                                   Time    ..........................................................................................................................................  Reviewed by Signature/Title    ...................................................              ..............................................  Date                                               Time          22EPIC Rev 08/18

## 2020-01-04 NOTE — ED TRIAGE NOTES
Pt was riding in car about an hour ago and became light headed and short of breath.  Pt being treated for thyroid and on halter monitor

## 2020-01-04 NOTE — ED PROVIDER NOTES
History     Chief Complaint   Patient presents with     Altered Mental Status     HPI  Norma Garcia is a 34 year old male who presents with concerns of an episode where he started to get very lightheaded, short of breath and sweaty.  Patient is currently being worked up for this and recently had a ZIO patch placed.  Patient also has hypothyroidism where they recently adjusted his medications.  Patient was up to 125 but he was having trouble sleeping with this and so his doctor 10 days ago cut him back down to 75.  Patient states today he was driving when he had this episode where he felt lightheaded and sweaty and felt very short of breath.  This past and currently he feels okay.  Denies any chest pain, denies a cough or URI-like symptoms.  Patient is not taking any over-the-counter medications currently.    Allergies:  Allergies   Allergen Reactions     No Known Drug Allergies        Problem List:    Patient Active Problem List    Diagnosis Date Noted     Hypothyroidism 04/03/2013     Priority: Medium     Psoriasis 03/30/2012     Priority: Medium     CARDIOVASCULAR SCREENING; LDL GOAL LESS THAN 160 10/31/2010     Priority: Medium        Past Medical History:    Past Medical History:   Diagnosis Date     Psoriasis      Thyroid disease        Past Surgical History:    No past surgical history on file.    Family History:    Family History   Problem Relation Age of Onset     Neurologic Disorder Paternal Grandmother         migraine headaches     Diabetes Maternal Grandmother        Social History:  Marital Status:  Single [1]  Social History     Tobacco Use     Smoking status: Never Smoker     Smokeless tobacco: Never Used   Substance Use Topics     Alcohol use: Yes     Comment: rare     Drug use: No        Medications:    levothyroxine (SYNTHROID/LEVOTHROID) 88 MCG tablet          Review of Systems   All other systems reviewed and are negative.      Physical Exam   BP: (!) 170/91  Pulse: 84  Heart Rate: 75  Temp:  98.5  F (36.9  C)  Resp: 18  Weight: 102.1 kg (225 lb)  SpO2: 100 %  Lying Orthostatic BP: (S) 135/74  Lying Orthostatic Pulse: (S) 75 bpm  Sitting Orthostatic BP: (S) 133/77  Sitting Orthostatic Pulse: (S) 71 bpm  Standing Orthostatic BP: (S) 129/89  Standing Orthostatic Pulse: (S) 72 bpm      Physical Exam  Vitals signs and nursing note reviewed.   Constitutional:       General: He is not in acute distress.     Appearance: He is well-developed. He is not diaphoretic.   HENT:      Head: Normocephalic and atraumatic.      Nose: Nose normal.      Mouth/Throat:      Pharynx: No oropharyngeal exudate.   Eyes:      General: No scleral icterus.     Conjunctiva/sclera: Conjunctivae normal.      Pupils: Pupils are equal, round, and reactive to light.   Neck:      Musculoskeletal: Normal range of motion.   Cardiovascular:      Rate and Rhythm: Normal rate and regular rhythm.      Heart sounds: Normal heart sounds. No murmur. No friction rub.   Pulmonary:      Effort: Pulmonary effort is normal. No respiratory distress.      Breath sounds: Normal breath sounds. No wheezing or rales.   Abdominal:      General: Bowel sounds are normal. There is no distension.      Palpations: Abdomen is soft. There is no mass.      Tenderness: There is no abdominal tenderness. There is no guarding or rebound.   Musculoskeletal: Normal range of motion.         General: No tenderness.   Skin:     General: Skin is warm.      Findings: No rash.   Neurological:      Mental Status: He is alert and oriented to person, place, and time.   Psychiatric:         Judgment: Judgment normal.         ED Course        Procedures             Results for orders placed or performed during the hospital encounter of 01/04/20 (from the past 24 hour(s))   CBC with platelets differential   Result Value Ref Range    WBC 5.8 4.0 - 11.0 10e9/L    RBC Count 5.17 4.4 - 5.9 10e12/L    Hemoglobin 15.5 13.3 - 17.7 g/dL    Hematocrit 46.5 40.0 - 53.0 %    MCV 90 78 - 100 fl     MCH 30.0 26.5 - 33.0 pg    MCHC 33.3 31.5 - 36.5 g/dL    RDW 11.9 10.0 - 15.0 %    Platelet Count 346 150 - 450 10e9/L    Diff Method Automated Method     % Neutrophils 59.3 %    % Lymphocytes 30.1 %    % Monocytes 8.2 %    % Eosinophils 1.5 %    % Basophils 0.7 %    % Immature Granulocytes 0.2 %    Nucleated RBCs 0 0 /100    Absolute Neutrophil 3.5 1.6 - 8.3 10e9/L    Absolute Lymphocytes 1.8 0.8 - 5.3 10e9/L    Absolute Monocytes 0.5 0.0 - 1.3 10e9/L    Absolute Basophils 0.0 0.0 - 0.2 10e9/L    Abs Immature Granulocytes 0.0 0 - 0.4 10e9/L    Absolute Nucleated RBC 0.0    Basic metabolic panel   Result Value Ref Range    Sodium 139 133 - 144 mmol/L    Potassium 3.8 3.4 - 5.3 mmol/L    Chloride 105 94 - 109 mmol/L    Carbon Dioxide 28 20 - 32 mmol/L    Anion Gap 6 3 - 14 mmol/L    Glucose 111 (H) 70 - 99 mg/dL    Urea Nitrogen 18 7 - 30 mg/dL    Creatinine 1.17 0.66 - 1.25 mg/dL    GFR Estimate 82 >60 mL/min/[1.73_m2]    GFR Estimate If Black >90 >60 mL/min/[1.73_m2]    Calcium 9.2 8.5 - 10.1 mg/dL   TSH with free T4 reflex   Result Value Ref Range    TSH 4.08 (H) 0.40 - 4.00 mU/L   Troponin I   Result Value Ref Range    Troponin I ES <0.015 0.000 - 0.045 ug/L   T4 free   Result Value Ref Range    T4 Free 0.93 0.76 - 1.46 ng/dL       Medications   LORazepam (ATIVAN) injection 0.5 mg (0.5 mg Intravenous Not Given 1/4/20 1313)   0.9% sodium chloride BOLUS (0 mLs Intravenous Stopped 1/4/20 1259)     While nursing was placing an IV, patient started to get the symptoms back again.  He felt very lightheaded, sweaty and was getting tunnel vision and felt short of breath.  He was on the cardiac monitor at this time and I was called into the room immediately.  His heart rate stayed consistently between 57 and 65.  There did not appear to be any arrhythmias or pauses.  I checked the blood pressure and his pressure did drop.  He been having the symptoms for a while but we got a blood pressure of 100 systolic.  I suspected  that the patient actually had a vasovagal response.  We laid the patient down flat and in Trendelenburg and IV fluids were going.  Patient quickly started to feel better again.  His labs were obtained and were mostly unremarkable.  His TSH is still elevated but his free T4 is normal.  This indicates to me that the patient has subclinical hypothyroidism.  Clearly though 125 mcg is too much for him I think this 75 is too low.  We will put him on 88 mcg to see if this may be works a little bit better.  I am going to have the patient drink lots of fluids over the weekend and I recommend follow-up with his doctor in clinic on Monday to consider referral for possible tilt table testing or further evaluation.    Assessments & Plan (with Medical Decision Making)  Subclinical hypothyroidism, vasovagal syndrome     I have reviewed the nursing notes.    I have reviewed the findings, diagnosis, plan and need for follow up with the patient.      New Prescriptions    LEVOTHYROXINE (SYNTHROID/LEVOTHROID) 88 MCG TABLET    Take 1 tablet (88 mcg) by mouth daily       Final diagnoses:   Subclinical hypothyroidism   Vasovagal syndrome       1/4/2020   [facility] EMERGENCY DEPARTMENT     Shad Rodriguez MD  01/04/20 1834

## 2020-01-04 NOTE — ED NOTES
"After IV start, patient became hot, diaphoretic, and \"felt like he was going to black out.\" Dr. Rodriguez in room to assess and patient placed in trendelenburg position and blood pressure reassessed.   "

## 2020-01-04 NOTE — ED NOTES
Patient reports he has not had an episode since being in trendelenburg. Patient sat up to 35 degrees. Will continue to monitor vitals.

## 2020-01-07 ENCOUNTER — OFFICE VISIT (OUTPATIENT)
Dept: FAMILY MEDICINE | Facility: OTHER | Age: 35
End: 2020-01-07
Payer: COMMERCIAL

## 2020-01-07 VITALS
RESPIRATION RATE: 16 BRPM | WEIGHT: 212.9 LBS | SYSTOLIC BLOOD PRESSURE: 122 MMHG | HEIGHT: 75 IN | BODY MASS INDEX: 26.47 KG/M2 | TEMPERATURE: 97.3 F | OXYGEN SATURATION: 99 % | DIASTOLIC BLOOD PRESSURE: 80 MMHG | HEART RATE: 80 BPM

## 2020-01-07 DIAGNOSIS — R53.81 MALAISE AND FATIGUE: ICD-10-CM

## 2020-01-07 DIAGNOSIS — R63.4 WEIGHT LOSS: ICD-10-CM

## 2020-01-07 DIAGNOSIS — R06.02 SOB (SHORTNESS OF BREATH): ICD-10-CM

## 2020-01-07 DIAGNOSIS — R53.83 MALAISE AND FATIGUE: ICD-10-CM

## 2020-01-07 DIAGNOSIS — E03.9 HYPOTHYROIDISM, UNSPECIFIED TYPE: Primary | ICD-10-CM

## 2020-01-07 DIAGNOSIS — H53.8 BLURRED VISION: ICD-10-CM

## 2020-01-07 LAB
ALBUMIN SERPL-MCNC: 4.8 G/DL (ref 3.4–5)
ALP SERPL-CCNC: 91 U/L (ref 40–150)
ALT SERPL W P-5'-P-CCNC: 25 U/L (ref 0–70)
AST SERPL W P-5'-P-CCNC: 18 U/L (ref 0–45)
BILIRUB DIRECT SERPL-MCNC: 0.1 MG/DL (ref 0–0.2)
BILIRUB SERPL-MCNC: 0.6 MG/DL (ref 0.2–1.3)
CRP SERPL-MCNC: <2.9 MG/L (ref 0–8)
ERYTHROCYTE [SEDIMENTATION RATE] IN BLOOD BY WESTERGREN METHOD: 9 MM/H (ref 0–15)
PROT SERPL-MCNC: 8.8 G/DL (ref 6.8–8.8)

## 2020-01-07 PROCEDURE — 99214 OFFICE O/P EST MOD 30 MIN: CPT | Performed by: PHYSICIAN ASSISTANT

## 2020-01-07 PROCEDURE — 86140 C-REACTIVE PROTEIN: CPT | Performed by: PHYSICIAN ASSISTANT

## 2020-01-07 PROCEDURE — 87389 HIV-1 AG W/HIV-1&-2 AB AG IA: CPT | Performed by: PHYSICIAN ASSISTANT

## 2020-01-07 PROCEDURE — 86803 HEPATITIS C AB TEST: CPT | Performed by: PHYSICIAN ASSISTANT

## 2020-01-07 PROCEDURE — 80076 HEPATIC FUNCTION PANEL: CPT | Performed by: PHYSICIAN ASSISTANT

## 2020-01-07 PROCEDURE — 36415 COLL VENOUS BLD VENIPUNCTURE: CPT | Performed by: PHYSICIAN ASSISTANT

## 2020-01-07 PROCEDURE — 85652 RBC SED RATE AUTOMATED: CPT | Performed by: PHYSICIAN ASSISTANT

## 2020-01-07 ASSESSMENT — PAIN SCALES - GENERAL: PAINLEVEL: NO PAIN (0)

## 2020-01-07 ASSESSMENT — MIFFLIN-ST. JEOR: SCORE: 1997.37

## 2020-01-07 NOTE — NURSING NOTE
Health Maintenance Due   Topic Date Due     PREVENTIVE CARE VISIT  03/16/2008     DTAP/TDAP/TD IMMUNIZATION (8 - Td) 11/04/2019     PHQ-2  01/01/2020     America MARSH LPN

## 2020-01-07 NOTE — PROGRESS NOTES
"Subjective     Normakamar Garcia is a 34 year old male who presents to clinic today for the following health issues:    HPI   ED/UC Followup:    Facility:  [facility] emergency room  Date of visit: 1-4-2020  Reason for visit: racing heart rate, SOB  Current Status: slight relief     Patient is a 34 year old male who presents today for follow up of ED visit on 01/04/2020. He was advised to increase the amount of levothyroxine taken daily. Noted to have had a vasovagal episode leading to his light headedness. Patient states that since the visit he has been drinking more water than \"ever before in my life\". He is worried as he says that he has noticed off/on blurry or \"off\" vision. Could not specify how frequently this occurs. He also says that he has had some SOB. Patient is currently wearing ziopatch to assess for arrhythmia. Complains of general feeling of malaise and fatigue. More concerning is that the patient's weight at time of last few visits was 224-227 lbs. Now, 1 week later, he weighed in at 212lbs today. I did recheck this weight and it returned at 211lbs. He asserts that the ED did weigh him and that he has not been skipping meals, but says that he does not eat as much at times. Review of lab work shows normal A1c of 5.3 in December 2019 and recent ED workup without gross abnormality. I did discuss with the patient that we could stop the levothyroxine and monitor his weight as well as TSH to see if it becomes more irregular. Denies IV drug use, multiple sexual partners, recent travel, headaches, fever, dizziness/vertigo, changes in hearing, recent illness, chest pain, trouble breathing, abdominal upset, changes in bowel/bladder, temperature intolerance, weakness/numbness/tingling.       While nursing was placing an IV, patient started to get the symptoms back again.  He felt very lightheaded, sweaty and was getting tunnel vision and felt short of breath.  He was on the cardiac monitor at this " "time and I was called into the room immediately.  His heart rate stayed consistently between 57 and 65.  There did not appear to be any arrhythmias or pauses.  I checked the blood pressure and his pressure did drop.  He been having the symptoms for a while but we got a blood pressure of 100 systolic.  I suspected that the patient actually had a vasovagal response.  We laid the patient down flat and in Trendelenburg and IV fluids were going.  Patient quickly started to feel better again.  His labs were obtained and were mostly unremarkable.  His TSH is still elevated but his free T4 is normal.  This indicates to me that the patient has subclinical hypothyroidism.  Clearly though 125 mcg is too much for him I think this 75 is too low.  We will put him on 88 mcg to see if this may be works a little bit better.  I am going to have the patient drink lots of fluids over the weekend and I recommend follow-up with his doctor in clinic on Monday to consider referral for possible tilt table testing or further evaluation.  Reviewed and updated as needed this visit by Provider    Review of Systems   ROS COMP: Constitutional, HEENT, cardiovascular, pulmonary, gi and gu systems are negative, except as otherwise noted.      Objective    /80 (BP Location: Right arm, Patient Position: Chair, Cuff Size: Adult Large)   Pulse 80   Temp 97.3  F (36.3  C) (Oral)   Resp 16   Ht 1.899 m (6' 2.75\")   Wt 96.6 kg (212 lb 14.4 oz)   SpO2 99%   BMI 26.79 kg/m    Body mass index is 26.79 kg/m .  Physical Exam   GENERAL: healthy, alert and no distress  EYES: Eyes grossly normal to inspection, PERRL and conjunctivae and sclerae normal  NECK: no adenopathy, no asymmetry, masses, or scars and thyroid normal to palpation  RESP: lungs clear to auscultation - no rales, rhonchi or wheezes  CV: regular rate and rhythm, normal S1 S2, no S3 or S4, no murmur, click or rub, no peripheral edema and peripheral pulses strong  MS: no gross " musculoskeletal defects noted, no edema  NEURO: Normal strength and tone, mentation intact and speech normal  PSYCH: mentation appears normal, affect normal/bright    Diagnostic Test Results:  Results for orders placed or performed in visit on 01/07/20 (from the past 24 hour(s))   ESR: Erythrocyte sedimentation rate   Result Value Ref Range    Sed Rate 9 0 - 15 mm/h           Assessment & Plan       ICD-10-CM    1. Hypothyroidism, unspecified type E03.9    2. Weight loss R63.4 Hepatitis C antibody     HIV Antigen Antibody Combo     ESR: Erythrocyte sedimentation rate     CRP, inflammation     Hepatic panel   3. Blurred vision H53.8    4. SOB (shortness of breath) R06.02    5. Malaise and fatigue R53.81     R53.83       Unclear cause for sudden weight loss and increasing symptoms of feeling unwell. Lab work has been grossly normal up to this point. Discussed the case with internal medicine who recommended checking for alternative causes for weight loss. Patient was agreeable to this. Will wait on results and consider IM consult if normal.     Follow up with clinic for annual checkup or sooner if conditions change, worsen or fail to improve as expected.      Bill Whitlock PA-C  [facility]

## 2020-01-08 ENCOUNTER — MYC MEDICAL ADVICE (OUTPATIENT)
Dept: FAMILY MEDICINE | Facility: OTHER | Age: 35
End: 2020-01-08

## 2020-01-08 DIAGNOSIS — E03.9 HYPOTHYROIDISM, UNSPECIFIED TYPE: Primary | ICD-10-CM

## 2020-01-08 DIAGNOSIS — R53.83 FATIGUE, UNSPECIFIED TYPE: ICD-10-CM

## 2020-01-08 DIAGNOSIS — R06.02 SOB (SHORTNESS OF BREATH): ICD-10-CM

## 2020-01-08 DIAGNOSIS — R63.4 WEIGHT LOSS: ICD-10-CM

## 2020-01-08 LAB
HCV AB SERPL QL IA: NONREACTIVE
HIV 1+2 AB+HIV1 P24 AG SERPL QL IA: NONREACTIVE

## 2020-01-08 NOTE — TELEPHONE ENCOUNTER
Routing to JR to advise on [employer] message about Endochronologist. I did send message to patient that she can call clinic to make appointment with Dr. Turk or Dr. Davis and she did not need a referral for that.     Shelbi Olvera MA

## 2020-01-08 NOTE — TELEPHONE ENCOUNTER
Patient calling back, would like to see an endocrinologist. Patient feels that is the best route, patient is aware that provider is out of the office today. Please call when referral is placed.

## 2020-01-09 NOTE — TELEPHONE ENCOUNTER
Referral placed, please help patient schedule for endocrinology.    Bill Whitlock PA-C on 1/9/2020 at 7:34 AM

## 2020-01-13 ENCOUNTER — OFFICE VISIT (OUTPATIENT)
Dept: ENDOCRINOLOGY | Facility: CLINIC | Age: 35
End: 2020-01-13
Attending: PHYSICIAN ASSISTANT
Payer: COMMERCIAL

## 2020-01-13 VITALS
RESPIRATION RATE: 16 BRPM | HEIGHT: 75 IN | SYSTOLIC BLOOD PRESSURE: 132 MMHG | WEIGHT: 213 LBS | DIASTOLIC BLOOD PRESSURE: 81 MMHG | BODY MASS INDEX: 26.49 KG/M2 | HEART RATE: 64 BPM

## 2020-01-13 DIAGNOSIS — R06.02 SOB (SHORTNESS OF BREATH): ICD-10-CM

## 2020-01-13 DIAGNOSIS — E03.9 HYPOTHYROIDISM, UNSPECIFIED TYPE: Primary | ICD-10-CM

## 2020-01-13 DIAGNOSIS — R53.83 FATIGUE, UNSPECIFIED TYPE: ICD-10-CM

## 2020-01-13 DIAGNOSIS — R00.2 PALPITATIONS: ICD-10-CM

## 2020-01-13 PROCEDURE — 99205 OFFICE O/P NEW HI 60 MIN: CPT | Performed by: INTERNAL MEDICINE

## 2020-01-13 ASSESSMENT — MIFFLIN-ST. JEOR: SCORE: 1997.82

## 2020-01-13 NOTE — NURSING NOTE
"Chief Complaint   Patient presents with     New Patient     hypothroidism       Initial /81 (BP Location: Left arm, Patient Position: Sitting, Cuff Size: Adult Large)   Pulse 64   Resp 16   Ht 1.899 m (6' 2.75\")   Wt 96.6 kg (213 lb)   BMI 26.80 kg/m   Estimated body mass index is 26.8 kg/m  as calculated from the following:    Height as of this encounter: 1.899 m (6' 2.75\").    Weight as of this encounter: 96.6 kg (213 lb).  BP completed using cuff size: large  Medications and allergies reviewed.      Kari FIGUEROA MA    "

## 2020-01-13 NOTE — PROGRESS NOTES
"CC: Hypothyroidism.     HPI: Patient presents for evaluation of hypothyroidism.   Began taking medication in fall 2019.   Found on workup for shortness of breath.   Started on levothyroxine but his SOB persists.     He has lost 14 pounds since the fall.   Eating less 2/2 nausea.   No emesis or diarrhea.     Random symptom of \"cheeks and ears getting hot\".   Accompanied by SOB, palpitations, lightheadedness, and tremors.   No syncope.   Episodes last minutes to an entire day.   Cannot find a clear trigger or alleviating factor. No relation to eating.     Anxiety medication has been recommended but he has declined.   Notes no major life changes recently.     He tries to sleep 8 hours a night.   More problems falling asleep when he was on 125 mcg levothyroxine earlier in the fall.   Now he just wakes easily and struggles to fall back asleep.     ROS: 10 point ROS neg other than the symptoms noted above in the HPI.    PMH:   Patient Active Problem List   Diagnosis     CARDIOVASCULAR SCREENING; LDL GOAL LESS THAN 160     Psoriasis     Hypothyroidism     Meds:  Current Outpatient Medications   Medication     levothyroxine (SYNTHROID/LEVOTHROID) 88 MCG tablet     No current facility-administered medications for this visit.      FHX:   Mother has \"thyroid issues\"  No adrenal disease.     SHX:  No tobacco, alcohol or drug use.   Works as a  and a farmer.     Exam:   Vital signs:      BP: 132/81 Pulse: 64   Resp: 16       Height: 189.9 cm (6' 2.75\") Weight: 96.6 kg (213 lb)  Estimated body mass index is 26.8 kg/m  as calculated from the following:    Height as of this encounter: 1.899 m (6' 2.75\").    Weight as of this encounter: 96.6 kg (213 lb).  Gen: In NAD.   HEENT: no proptosis or lid lag, EOMI, no palpable thyroid tissue.  Card: S1 S2 RRR no m/r/g. no LE edema.   Pulm: CTA b/l.   GI: NT ND +BS.   MSK: no gross deformities.   Derm: psoriatic patches on right hand.   Neuro: no tremor, +2 DTR's.     A/P: "   Hypothyroidism - Outside records reviewed. Extensive discussion of thyroid hormone and normal physiology. Included was discussion of thyroid in  relation to weight and energy. Labs just outside normal range on last check.   TSH   Date Value Ref Range Status   01/04/2020 4.08 (H) 0.40 - 4.00 mU/L Final   12/03/2019 4.32 (H) 0.40 - 4.00 mU/L Final   11/25/2019 2.57 0.40 - 4.00 mU/L Final   10/19/2019 21.88 (H) 0.40 - 4.00 mU/L Final   08/29/2013 8.39 (H) 0.4 - 5.0 mU/L Final   Dose of levothyroxine was recently increased from 75 to 88 mcg daily.   -Repeat thyroid labs in 4 weeks.     Fatigue - Episodic. Mild hypothyroidism which is likely playing little to no role in his fatigue. ESR, CRP, LFT's, CBC, and BMP all normal. Consider pheochromocytoma. No diarrhea to suggest carcinoid. No relation to meals to suggest post prandial hypoglycemia. Some of his symptoms may be acclimation to levothyroxine but his symptoms began prior to being on levothyroxine. I have discussed this with him and emphasized need to leave the dose alone for now. Consider anxiety disorder.   - jug for urine collection.     SOB - Episodic. Normal CXR in 10/2019. Normal EKG.   Normal Hgb, negative d-dimer, negative troponin.   -Zio patch in place. Follow up results.   -Labs as above.       Reinier Alvarez MD on 1/13/2020 at 3:55 PM

## 2020-01-13 NOTE — LETTER
"    1/13/2020         RE: Norma Garcia  [address] [place] [id number]        Dear Colleague,    Thank you for referring your patient, Norma Garcia, to the [facility]. Please see a copy of my visit note below.    CC: Hypothyroidism.     HPI: Patient presents for evaluation of hypothyroidism.   Began taking medication in fall 2019.   Found on workup for shortness of breath.   Started on levothyroxine but his SOB persists.     He has lost 14 pounds since the fall.   Eating less 2/2 nausea.   No emesis or diarrhea.     Random symptom of \"cheeks and ears getting hot\".   Accompanied by SOB, palpitations, lightheadedness, and tremors.   No syncope.   Episodes last minutes to an entire day.   Cannot find a clear trigger or alleviating factor. No relation to eating.     Anxiety medication has been recommended but he has declined.   Notes no major life changes recently.     He tries to sleep 8 hours a night.   More problems falling asleep when he was on 125 mcg levothyroxine earlier in the fall.   Now he just wakes easily and struggles to fall back asleep.     ROS: 10 point ROS neg other than the symptoms noted above in the HPI.    PMH:   Patient Active Problem List   Diagnosis     CARDIOVASCULAR SCREENING; LDL GOAL LESS THAN 160     Psoriasis     Hypothyroidism     Meds:  Current Outpatient Medications   Medication     levothyroxine (SYNTHROID/LEVOTHROID) 88 MCG tablet     No current facility-administered medications for this visit.      FHX:   Mother has \"thyroid issues\"  No adrenal disease.     SHX:  No tobacco, alcohol or drug use.   Works as a  and a farmer.     Exam:   Vital signs:      BP: 132/81 Pulse: 64   Resp: 16       Height: 189.9 cm (6' 2.75\") Weight: 96.6 kg (213 lb)  Estimated body mass index is 26.8 kg/m  as calculated from the following:    Height as of this encounter: 1.899 m (6' 2.75\").    Weight as of this encounter: 96.6 kg (213 lb).  Gen: In NAD.   HEENT: no proptosis or " lid lag, EOMI, no palpable thyroid tissue.  Card: S1 S2 RRR no m/r/g. no LE edema.   Pulm: CTA b/l.   GI: NT ND +BS.   MSK: no gross deformities.   Derm: psoriatic patches on right hand.   Neuro: no tremor, +2 DTR's.     A/P:   Hypothyroidism - Outside records reviewed. Extensive discussion of thyroid hormone and normal physiology. Included was discussion of thyroid in  relation to weight and energy. Labs just outside normal range on last check.   TSH   Date Value Ref Range Status   01/04/2020 4.08 (H) 0.40 - 4.00 mU/L Final   12/03/2019 4.32 (H) 0.40 - 4.00 mU/L Final   11/25/2019 2.57 0.40 - 4.00 mU/L Final   10/19/2019 21.88 (H) 0.40 - 4.00 mU/L Final   08/29/2013 8.39 (H) 0.4 - 5.0 mU/L Final   Dose of levothyroxine was recently increased from 75 to 88 mcg daily.   -Repeat thyroid labs in 4 weeks.     Fatigue - Episodic. Mild hypothyroidism which is likely playing little to no role in his fatigue. ESR, CRP, LFT's, CBC, and BMP all normal. Consider pheochromocytoma. No diarrhea to suggest carcinoid. No relation to meals to suggest post prandial hypoglycemia. Some of his symptoms may be acclimation to levothyroxine but his symptoms began prior to being on levothyroxine. I have discussed this with him and emphasized need to leave the dose alone for now. Consider anxiety disorder.   - jug for urine collection.     SOB - Episodic. Normal CXR in 10/2019. Normal EKG.   Normal Hgb, negative d-dimer, negative troponin.   -Zio patch in place. Follow up results.   -Labs as above.       Reinier Alvarez MD on 1/13/2020 at 3:55 PM          Again, thank you for allowing me to participate in the care of your patient.        Sincerely,        Reinier Alvarez MD

## 2020-01-19 DIAGNOSIS — R00.2 PALPITATIONS: ICD-10-CM

## 2020-01-19 LAB
COLLECT DURATION TIME UR: 24 H
CREAT 24H UR-MRATE: 2.19 G/(24.H) (ref 1–2)
CREAT UR-MCNC: 64 MG/DL
SPECIMEN VOL UR: 3425 ML

## 2020-01-19 PROCEDURE — 81050 URINALYSIS VOLUME MEASURE: CPT | Performed by: INTERNAL MEDICINE

## 2020-01-19 PROCEDURE — 82570 ASSAY OF URINE CREATININE: CPT | Performed by: INTERNAL MEDICINE

## 2020-01-19 PROCEDURE — 83835 ASSAY OF METANEPHRINES: CPT | Mod: 90 | Performed by: INTERNAL MEDICINE

## 2020-01-19 PROCEDURE — 99000 SPECIMEN HANDLING OFFICE-LAB: CPT | Performed by: INTERNAL MEDICINE

## 2020-01-22 LAB
COLLECT DURATION TIME SPEC: 24 H
CREAT 24H UR-MRATE: 2363 MG/D (ref 1000–2500)
CREAT UR-MCNC: 69 MG/DL
METANEPH 24H UR-MCNC: 34 UG/L
METANEPH 24H UR-MRATE: 116 UG/D (ref 55–320)
METANEPH+NORMETANEPH UR-IMP: NORMAL
METANEPH/CREAT 24H UR: 49 UG/G CRT (ref 0–300)
NORMETANEPHRINE 24H UR-MCNC: 63 UG/L
NORMETANEPHRINE 24H UR-MRATE: 216 UG/D (ref 114–865)
NORMETANEPHRINE/CREAT 24H UR: 91 UG/G CRT (ref 0–400)
SPECIMEN VOL ?TM UR: 3425 ML

## 2020-01-24 NOTE — RESULT ENCOUNTER NOTE
That heart monitor report came back and shows no concerning rhythm disturbances just a few scattered dropped beats and skipped beats.

## 2020-01-29 DIAGNOSIS — E03.9 HYPOTHYROIDISM, UNSPECIFIED TYPE: Primary | ICD-10-CM

## 2020-01-29 RX ORDER — LEVOTHYROXINE SODIUM 88 UG/1
88 TABLET ORAL DAILY
Qty: 30 TABLET | Refills: 0 | Status: SHIPPED | OUTPATIENT
Start: 2020-01-29 | End: 2020-02-27

## 2020-01-29 NOTE — TELEPHONE ENCOUNTER
Routing refill request to provider for review/approval because:  Labs out of range:  TSH  Dosage changed in ED visit    Brit Carr RN BSN

## 2020-01-29 NOTE — TELEPHONE ENCOUNTER
"Levothyroxine  Last Written Prescription Date:  01/4/2020  Last Fill Quantity: 30,  # refills: 0   Last office visit: 1/7/2020 with prescribing provider:     Future Office Visit:        Requested Prescriptions   Pending Prescriptions Disp Refills     levothyroxine (SYNTHROID/LEVOTHROID) 88 MCG tablet 30 tablet 0     Sig: Take 1 tablet (88 mcg) by mouth daily       Thyroid Protocol Failed - 1/29/2020 11:03 AM        Failed - Normal TSH on file in past 12 months     Recent Labs   Lab Test 01/04/20  1155   TSH 4.08*              Passed - Patient is 12 years or older        Passed - Recent (12 mo) or future (30 days) visit within the authorizing provider's specialty     Patient has had an office visit with the authorizing provider or a provider within the authorizing providers department within the previous 12 mos or has a future within next 30 days. See \"Patient Info\" tab in inbasket, or \"Choose Columns\" in Meds & Orders section of the refill encounter.              Passed - Medication is active on med list          "

## 2020-02-06 DIAGNOSIS — E03.9 HYPOTHYROIDISM, UNSPECIFIED TYPE: ICD-10-CM

## 2020-02-06 LAB
T4 FREE SERPL-MCNC: 0.88 NG/DL (ref 0.76–1.46)
TSH SERPL DL<=0.005 MIU/L-ACNC: 5.88 MU/L (ref 0.4–4)

## 2020-02-06 PROCEDURE — 84439 ASSAY OF FREE THYROXINE: CPT | Performed by: PHYSICIAN ASSISTANT

## 2020-02-06 PROCEDURE — 84443 ASSAY THYROID STIM HORMONE: CPT | Performed by: PHYSICIAN ASSISTANT

## 2020-02-06 PROCEDURE — 36415 COLL VENOUS BLD VENIPUNCTURE: CPT | Performed by: PHYSICIAN ASSISTANT

## 2020-02-26 DIAGNOSIS — E03.9 HYPOTHYROIDISM, UNSPECIFIED TYPE: ICD-10-CM

## 2020-02-27 RX ORDER — LEVOTHYROXINE SODIUM 88 UG/1
TABLET ORAL
Qty: 30 TABLET | Refills: 11 | Status: SHIPPED | OUTPATIENT
Start: 2020-02-27 | End: 2023-01-17

## 2020-02-27 NOTE — TELEPHONE ENCOUNTER
LEVOTHYROXINE 88 mcg    Component      Latest Ref Rng & Units 2/6/2020   T4 Free      0.76 - 1.46 ng/dL 0.88   Last Written Prescription Date:  1/29/20  Last Fill Quantity: 30,  # refills: 0   Last office visit: 1/7/2020 with prescribing provider:  Dr. Palma   Future Office Visit:  NONE  Prescription approved per [facility] [place]..........VARUN Correa

## 2020-03-22 ENCOUNTER — HEALTH MAINTENANCE LETTER (OUTPATIENT)
Age: 35
End: 2020-03-22

## 2020-06-23 ENCOUNTER — TELEPHONE (OUTPATIENT)
Dept: FAMILY MEDICINE | Facility: CLINIC | Age: 35
End: 2020-06-23

## 2020-06-23 DIAGNOSIS — E03.9 HYPOTHYROIDISM, UNSPECIFIED TYPE: Primary | ICD-10-CM

## 2020-06-23 NOTE — TELEPHONE ENCOUNTER
I have placed a future order for this to be done anytime in the next month. They should call clinic to be placed on the lab schedule prior to coming in.     Bill Whitlock PA-C on 6/23/2020 at 1:13 PM

## 2020-06-23 NOTE — TELEPHONE ENCOUNTER
Reason for Call: Request for an order or referral:    Order or referral being requested: Thyroid check    Date needed: as soon as possible    Has the patient been seen by the PCP for this problem? YES    Additional comments:     Phone number Patient can be reached at:  Home number on file [phone] (home)    Best Time:  any    Can we leave a detailed message on this number?  YES    Call taken on 6/23/2020 at 7:35 AM by Yomaira Melton CNA

## 2021-01-15 ENCOUNTER — HEALTH MAINTENANCE LETTER (OUTPATIENT)
Age: 36
End: 2021-01-15

## 2021-05-15 ENCOUNTER — HEALTH MAINTENANCE LETTER (OUTPATIENT)
Age: 36
End: 2021-05-15

## 2021-09-04 ENCOUNTER — HEALTH MAINTENANCE LETTER (OUTPATIENT)
Age: 36
End: 2021-09-04

## 2022-06-11 ENCOUNTER — HEALTH MAINTENANCE LETTER (OUTPATIENT)
Age: 37
End: 2022-06-11

## 2022-08-24 ENCOUNTER — HOSPITAL ENCOUNTER (EMERGENCY)
Facility: CLINIC | Age: 37
Discharge: HOME | End: 2022-08-24
Attending: EMERGENCY MEDICINE | Admitting: EMERGENCY MEDICINE
Payer: COMMERCIAL

## 2022-08-24 VITALS
WEIGHT: 220 LBS | BODY MASS INDEX: 27.35 KG/M2 | SYSTOLIC BLOOD PRESSURE: 120 MMHG | OXYGEN SATURATION: 96 % | HEART RATE: 95 BPM | RESPIRATION RATE: 12 BRPM | TEMPERATURE: 100 F | HEIGHT: 75 IN | DIASTOLIC BLOOD PRESSURE: 80 MMHG

## 2022-08-24 DIAGNOSIS — U07.1 INFECTION DUE TO 2019 NOVEL CORONAVIRUS: ICD-10-CM

## 2022-08-24 LAB
FLUAV RNA SPEC QL NAA+PROBE: NEGATIVE
FLUBV RNA RESP QL NAA+PROBE: NEGATIVE
SARS-COV-2 RNA RESP QL NAA+PROBE: POSITIVE

## 2022-08-24 PROCEDURE — 250N000011 HC RX IP 250 OP 636: Performed by: EMERGENCY MEDICINE

## 2022-08-24 PROCEDURE — C9803 HOPD COVID-19 SPEC COLLECT: HCPCS | Performed by: EMERGENCY MEDICINE

## 2022-08-24 PROCEDURE — 99284 EMERGENCY DEPT VISIT MOD MDM: CPT | Mod: CS | Performed by: EMERGENCY MEDICINE

## 2022-08-24 PROCEDURE — 99283 EMERGENCY DEPT VISIT LOW MDM: CPT | Mod: CS | Performed by: EMERGENCY MEDICINE

## 2022-08-24 PROCEDURE — 87636 SARSCOV2 & INF A&B AMP PRB: CPT | Performed by: EMERGENCY MEDICINE

## 2022-08-24 PROCEDURE — 250N000013 HC RX MED GY IP 250 OP 250 PS 637: Performed by: EMERGENCY MEDICINE

## 2022-08-24 PROCEDURE — 250N000009 HC RX 250: Performed by: EMERGENCY MEDICINE

## 2022-08-24 RX ORDER — ONDANSETRON 4 MG/1
4 TABLET, ORALLY DISINTEGRATING ORAL ONCE
Status: COMPLETED | OUTPATIENT
Start: 2022-08-24 | End: 2022-08-24

## 2022-08-24 RX ORDER — ONDANSETRON 4 MG/1
4 TABLET, FILM COATED ORAL EVERY 6 HOURS PRN
Qty: 10 TABLET | Refills: 0 | Status: SHIPPED | OUTPATIENT
Start: 2022-08-24 | End: 2022-08-27

## 2022-08-24 RX ORDER — DEXAMETHASONE SODIUM PHOSPHATE 10 MG/ML
6 INJECTION, SOLUTION INTRAMUSCULAR; INTRAVENOUS ONCE
Status: COMPLETED | OUTPATIENT
Start: 2022-08-24 | End: 2022-08-24

## 2022-08-24 RX ORDER — THYROID 60 MG
60 TABLET ORAL DAILY
COMMUNITY
Start: 2022-07-12 | End: 2023-06-09 | Stop reason: DRUGHIGH

## 2022-08-24 RX ORDER — IBUPROFEN 600 MG/1
600 TABLET, FILM COATED ORAL ONCE
Status: COMPLETED | OUTPATIENT
Start: 2022-08-24 | End: 2022-08-24

## 2022-08-24 RX ADMIN — ONDANSETRON 4 MG: 4 TABLET, ORALLY DISINTEGRATING ORAL at 07:33

## 2022-08-24 RX ADMIN — DEXAMETHASONE SODIUM PHOSPHATE 6 MG: 10 INJECTION, SOLUTION INTRAMUSCULAR; INTRAVENOUS at 07:33

## 2022-08-24 RX ADMIN — IBUPROFEN 600 MG: 600 TABLET ORAL at 07:33

## 2022-08-24 NOTE — ED PROVIDER NOTES
"  History     Chief Complaint   Patient presents with     Fever     HPI  Norma Garcia is a 37 year old adult who 1 to 2 days for fever, fatigue, headache, sore throat, nausea, upset stomach.  Did not test for COVID at home.      Allergies:  Allergies   Allergen Reactions     No Known Drug Allergies        Problem List:    Patient Active Problem List    Diagnosis Date Noted     Hypothyroidism 04/03/2013     Priority: Medium     Psoriasis 03/30/2012     Priority: Medium     CARDIOVASCULAR SCREENING; LDL GOAL LESS THAN 160 10/31/2010     Priority: Medium        Past Medical History:    Past Medical History:   Diagnosis Date     Psoriasis      Thyroid disease        Past Surgical History:    History reviewed. No pertinent surgical history.    Family History:    Family History   Problem Relation Age of Onset     Neurologic Disorder Paternal Grandmother         migraine headaches     Diabetes Maternal Grandmother        Social History:  Marital Status:  Single [1]  Social History     Tobacco Use     Smoking status: Never Smoker     Smokeless tobacco: Never Used   Substance Use Topics     Alcohol use: Yes     Comment: rare     Drug use: No        Medications:    ARMOUR THYROID 60 MG tablet  levothyroxine (SYNTHROID/LEVOTHROID) 88 MCG tablet          Review of Systems   All other systems reviewed and are negative.      Physical Exam   BP: (!) 143/90  Pulse: 94  Temp: 100  F (37.8  C)  Resp: 20  Height: 190.5 cm (6' 3\")  Weight: 99.8 kg (220 lb)  SpO2: 96 %      Physical Exam  Vitals and nursing note reviewed.   Constitutional:       Appearance: He is not ill-appearing.   HENT:      Head: Normocephalic.      Nose: Nose normal.   Eyes:      Conjunctiva/sclera: Conjunctivae normal.   Cardiovascular:      Rate and Rhythm: Normal rate.   Pulmonary:      Effort: Pulmonary effort is normal.   Skin:     General: Skin is warm.      Capillary Refill: Capillary refill takes less than 2 seconds.      Findings: No rash. "   Neurological:      General: No focal deficit present.      Mental Status: He is alert and oriented to person, place, and time.   Psychiatric:         Mood and Affect: Mood normal.         Behavior: Behavior normal.         ED Course                 Procedures                  Results for orders placed or performed during the hospital encounter of 08/24/22 (from the past 24 hour(s))   Symptomatic; Unknown Influenza A/B & SARS-CoV2 (COVID-19) Virus PCR Multiplex Nose    Specimen: Nose; Swab   Result Value Ref Range    Influenza A PCR Negative Negative    Influenza B PCR Negative Negative    SARS CoV2 PCR Positive (A) Negative    Narrative    Testing was performed using the jerome SARS-CoV-2 & Influenza A/B Assay on the jerome [employer] System. This test should be ordered for the detection of SARS-CoV-2 and influenza viruses in individuals who meet clinical and/or epidemiological criteria. Test performance is unknown in asymptomatic patients. This test is for in vitro diagnostic use under the FDA EUA for laboratories certified under CLIA to perform moderate and/or high complexity testing. This test has not been FDA cleared or approved. A negative result does not rule out the presence of PCR inhibitors in the specimen or target RNA in concentration below the limit of detection for the assay. If only one viral target is positive but coinfection with multiple targets is suspected, the sample should be re-tested with another FDA cleared, approved or authorized test, if coinfection would change clinical management. [facility] are certified under the Clinical Laboratory Improvement Amendments of 1988 (CLIA-88) as  qualified to perform moderate and/or high complexity laboratory testing.       Medications   dexamethasone (DECADRON) PF oral solution (inj used orally) 6 mg (has no administration in time range)   ibuprofen (ADVIL/MOTRIN) tablet 600 mg (has no administration in time range)   ondansetron (ZOFRAN ODT)  ODT tab 4 mg (has no administration in time range)       Assessments & Plan (with Medical Decision Making)  COVID-like symptoms for the last 2 days.  Test for PCR test pending.  8:14 AM  Confirmed COVID-positive.  No indication for antiviral therapy     I have reviewed the nursing notes.    I have reviewed the findings, diagnosis, plan and need for follow up with the patient.      New Prescriptions    No medications on file       Final diagnoses:   Infection due to 2019 novel coronavirus       8/24/2022   [facility] EMERGENCY DEPT     Vincent Palma,   08/24/22 0814

## 2022-08-24 NOTE — DISCHARGE INSTRUCTIONS
-COVID infection confirmed.  -Off work this week/contagious for 5 to 7 days  -Wear a mask wash hands thoroughly  -Alternate Tylenol 1000 mg every 6 hours with ibuprofen 600 mg  -Stay well-hydrated.   -Zofran as a prescription has been provided to help with nausea.  May place 1 tablet under the tongue every 16 hours as needed for nausea.

## 2022-10-22 ENCOUNTER — HEALTH MAINTENANCE LETTER (OUTPATIENT)
Age: 37
End: 2022-10-22

## 2023-01-17 ENCOUNTER — OFFICE VISIT (OUTPATIENT)
Dept: INTERNAL MEDICINE | Facility: CLINIC | Age: 38
End: 2023-01-17
Payer: COMMERCIAL

## 2023-01-17 VITALS
DIASTOLIC BLOOD PRESSURE: 84 MMHG | HEART RATE: 74 BPM | OXYGEN SATURATION: 98 % | BODY MASS INDEX: 30.75 KG/M2 | SYSTOLIC BLOOD PRESSURE: 128 MMHG | RESPIRATION RATE: 16 BRPM | WEIGHT: 246 LBS | TEMPERATURE: 98.3 F

## 2023-01-17 DIAGNOSIS — R07.9 CHEST PAIN, UNSPECIFIED TYPE: ICD-10-CM

## 2023-01-17 DIAGNOSIS — Z13.220 SCREENING FOR HYPERLIPIDEMIA: Primary | ICD-10-CM

## 2023-01-17 DIAGNOSIS — K21.00 GASTROESOPHAGEAL REFLUX DISEASE WITH ESOPHAGITIS WITHOUT HEMORRHAGE: ICD-10-CM

## 2023-01-17 DIAGNOSIS — E03.9 HYPOTHYROIDISM, UNSPECIFIED TYPE: ICD-10-CM

## 2023-01-17 LAB
ALBUMIN SERPL-MCNC: 4.1 G/DL (ref 3.4–5)
ALP SERPL-CCNC: 94 U/L (ref 40–150)
ALT SERPL W P-5'-P-CCNC: 59 U/L (ref 0–70)
ANION GAP SERPL CALCULATED.3IONS-SCNC: 3 MMOL/L (ref 3–14)
AST SERPL W P-5'-P-CCNC: 31 U/L (ref 0–45)
BILIRUB SERPL-MCNC: 0.5 MG/DL (ref 0.2–1.3)
BUN SERPL-MCNC: 16 MG/DL (ref 7–30)
CALCIUM SERPL-MCNC: 9.2 MG/DL (ref 8.5–10.1)
CHLORIDE BLD-SCNC: 107 MMOL/L (ref 94–109)
CHOLEST SERPL-MCNC: 194 MG/DL
CO2 SERPL-SCNC: 29 MMOL/L (ref 20–32)
CREAT SERPL-MCNC: 1.09 MG/DL (ref 0.52–1.25)
FASTING STATUS PATIENT QL REPORTED: YES
GFR SERPL CREATININE-BSD FRML MDRD: >90 ML/MIN/1.73M2
GLUCOSE BLD-MCNC: 107 MG/DL (ref 70–99)
HDLC SERPL-MCNC: 51 MG/DL
LDLC SERPL CALC-MCNC: 120 MG/DL
NONHDLC SERPL-MCNC: 143 MG/DL
POTASSIUM BLD-SCNC: 4.1 MMOL/L (ref 3.4–5.3)
PROT SERPL-MCNC: 8 G/DL (ref 6.8–8.8)
SODIUM SERPL-SCNC: 139 MMOL/L (ref 133–144)
TRIGL SERPL-MCNC: 115 MG/DL

## 2023-01-17 PROCEDURE — 99203 OFFICE O/P NEW LOW 30 MIN: CPT | Performed by: INTERNAL MEDICINE

## 2023-01-17 PROCEDURE — 80061 LIPID PANEL: CPT | Performed by: INTERNAL MEDICINE

## 2023-01-17 PROCEDURE — 80053 COMPREHEN METABOLIC PANEL: CPT | Performed by: INTERNAL MEDICINE

## 2023-01-17 PROCEDURE — 36415 COLL VENOUS BLD VENIPUNCTURE: CPT | Performed by: INTERNAL MEDICINE

## 2023-01-17 RX ORDER — ESOMEPRAZOLE MAGNESIUM 40 MG/1
40 CAPSULE, DELAYED RELEASE ORAL
Qty: 30 CAPSULE | Refills: 0 | Status: SHIPPED | OUTPATIENT
Start: 2023-01-17 | End: 2023-05-31

## 2023-01-17 ASSESSMENT — PAIN SCALES - GENERAL: PAINLEVEL: NO PAIN (0)

## 2023-01-17 NOTE — PROGRESS NOTES
Francine Green is a 37 year old, presenting for the following health issues:  Chest Pain      History of Present Illness       Reason for visit:  Chest discomfort  Symptom onset:  1-2 weeks ago  Symptoms include:  Chest discomfort  Symptom intensity:  Mild  Symptom progression:  Staying the same  Had these symptoms before:  No    He eats 0-1 servings of fruits and vegetables daily.He consumes 0 sweetened beverage(s) daily.He exercises with enough effort to increase his heart rate 9 or less minutes per day.  He exercises with enough effort to increase his heart rate 3 or less days per week.   He is taking medications regularly.        EMR reviewed including:             Complaint, History of Chief Complaint, Corresponding Review of Systems, and Complaint Specific Physical Examination.    #1   Chest pain  Pressure-like with radiation to left  Not always associated with exertion  Not associated with meals  Becoming more frequent  No known family history of heart disease  No edema or shortness of breath.  Denies orthopnea        Exam:   LUNGS: clear bilaterally, airflow is brisk, no intercostal retraction or stridor is noted. No coughing is noted during visit.   HEART:  regular without rubs, clicks, gallops, or murmurs. PMI is nondisplaced. Upstrokes are brisk. S1,S2 are heard.   NEURO: Pt is alert and appropriate. No neurologic lateralization is noted. Cranial nerves 2-12 are intact. Peripheral sensory and motor function are grossly normal.         Patient has been interviewed, applicable history and applied review of systems have been performed.    Vital Signs:   /84   Pulse 74   Temp 98.3  F (36.8  C) (Temporal)   Resp 16   Wt 111.6 kg (246 lb)   SpO2 98%   BMI 30.75 kg/m        Decision Making    Problem and Complexity     1. Chest pain, unspecified type  Because of the progressive nature of his chest discomfort and the lack of correlation to specific causes, we will set up stress  echo.  Patient realizes that cardiac ischemia is statistically less likely but certainly possible.  - Echocardiogram Exercise Stress; Future  - Comprehensive metabolic panel (BMP + Alb, Alk Phos, ALT, AST, Total. Bili, TP); Future    2. Gastroesophageal reflux disease with esophagitis without hemorrhage  Will treat for gastroesophageal reflux disease.  This is statistically more likely but symptoms do not necessarily point in this direction.  - esomeprazole (NEXIUM) 40 MG DR capsule; Take 1 capsule (40 mg) by mouth every morning (before breakfast) Take 30-60 minutes before eating.  Dispense: 30 capsule; Refill: 0    3. Hypothyroidism, unspecified type  Follow-up with endocrinologist.    4. Screening for hyperlipidemia  Screening for evaluation risk factor  - Lipid panel reflex to direct LDL Non-fasting; Future                                FOLLOW UP   I have asked the patient to make an appointment for followup with either:  1.  Me,  2.  The patient's preferred provider,  3.  Any available provider  In 2 weeks/after the stress test.    Patient is instructed to report to the emergency department immediately if chest pain recurs.  He is currently not having any        Regarding routine vaccinations:  I have reviewed the patient's vaccination schedule and discussed the benefits of prophylactic vaccination in detail.  I recommend the patient contact their pharmacist (not the pharmacy within the clinic) for vaccinations.  Discussed that most insurance companies now favor reimbursement to the pharmacies and it will financially behoove the patient to have vaccinations performed at their pharmacy.        I have carefully explained the diagnosis and treatment options to the patient.  The patient has displayed an understanding of the above, and all subsequent questions were answered.      DO ANDRY Rene    Portions of this note were produced using [employer]  Although every attempt at real-time proof  reading has been made, occasional grammar/syntax errors may have been missed.

## 2023-01-18 ENCOUNTER — HOSPITAL ENCOUNTER (OUTPATIENT)
Dept: CARDIOLOGY | Facility: CLINIC | Age: 38
Discharge: HOME | End: 2023-01-18
Attending: INTERNAL MEDICINE | Admitting: INTERNAL MEDICINE
Payer: COMMERCIAL

## 2023-01-18 DIAGNOSIS — R07.9 CHEST PAIN, UNSPECIFIED TYPE: ICD-10-CM

## 2023-01-18 PROCEDURE — 93018 CV STRESS TEST I&R ONLY: CPT | Performed by: INTERNAL MEDICINE

## 2023-01-18 PROCEDURE — 93350 STRESS TTE ONLY: CPT | Mod: 26 | Performed by: INTERNAL MEDICINE

## 2023-01-18 PROCEDURE — 93321 DOPPLER ECHO F-UP/LMTD STD: CPT | Mod: 26 | Performed by: INTERNAL MEDICINE

## 2023-01-18 PROCEDURE — 93325 DOPPLER ECHO COLOR FLOW MAPG: CPT | Mod: 26 | Performed by: INTERNAL MEDICINE

## 2023-01-18 PROCEDURE — 255N000002 HC RX 255 OP 636: Performed by: INTERNAL MEDICINE

## 2023-01-18 PROCEDURE — 93016 CV STRESS TEST SUPVJ ONLY: CPT | Performed by: INTERNAL MEDICINE

## 2023-01-18 PROCEDURE — 93321 DOPPLER ECHO F-UP/LMTD STD: CPT | Mod: TC

## 2023-01-18 RX ADMIN — HUMAN ALBUMIN MICROSPHERES AND PERFLUTREN 6 ML: 10; .22 INJECTION, SOLUTION INTRAVENOUS at 09:27

## 2023-05-31 ENCOUNTER — OFFICE VISIT (OUTPATIENT)
Dept: FAMILY MEDICINE | Facility: CLINIC | Age: 38
End: 2023-05-31
Payer: COMMERCIAL

## 2023-05-31 VITALS
WEIGHT: 240.5 LBS | SYSTOLIC BLOOD PRESSURE: 110 MMHG | BODY MASS INDEX: 29.9 KG/M2 | TEMPERATURE: 97.7 F | OXYGEN SATURATION: 98 % | RESPIRATION RATE: 18 BRPM | HEART RATE: 68 BPM | HEIGHT: 75 IN | DIASTOLIC BLOOD PRESSURE: 70 MMHG

## 2023-05-31 DIAGNOSIS — E06.3 HYPOTHYROIDISM DUE TO HASHIMOTO'S THYROIDITIS: ICD-10-CM

## 2023-05-31 DIAGNOSIS — N63.42 SUBAREOLAR MASS OF LEFT BREAST: ICD-10-CM

## 2023-05-31 DIAGNOSIS — N62 GYNECOMASTIA, MALE: Primary | ICD-10-CM

## 2023-05-31 LAB
FSH SERPL IRP2-ACNC: 2.7 MIU/ML (ref 1.5–134.8)
LH SERPL-ACNC: 3.6 MIU/ML
PROLACTIN SERPL 3RD IS-MCNC: 12 NG/ML (ref 4–23)
T4 FREE SERPL-MCNC: 0.63 NG/DL (ref 0.9–1.7)
TSH SERPL DL<=0.005 MIU/L-ACNC: 13.03 UIU/ML (ref 0.3–4.2)

## 2023-05-31 PROCEDURE — 36415 COLL VENOUS BLD VENIPUNCTURE: CPT | Performed by: FAMILY MEDICINE

## 2023-05-31 PROCEDURE — 83002 ASSAY OF GONADOTROPIN (LH): CPT | Performed by: FAMILY MEDICINE

## 2023-05-31 PROCEDURE — 83001 ASSAY OF GONADOTROPIN (FSH): CPT | Performed by: FAMILY MEDICINE

## 2023-05-31 PROCEDURE — 84443 ASSAY THYROID STIM HORMONE: CPT | Performed by: FAMILY MEDICINE

## 2023-05-31 PROCEDURE — 84146 ASSAY OF PROLACTIN: CPT | Performed by: FAMILY MEDICINE

## 2023-05-31 PROCEDURE — 99214 OFFICE O/P EST MOD 30 MIN: CPT | Performed by: FAMILY MEDICINE

## 2023-05-31 PROCEDURE — 84439 ASSAY OF FREE THYROXINE: CPT | Performed by: FAMILY MEDICINE

## 2023-05-31 PROCEDURE — 84403 ASSAY OF TOTAL TESTOSTERONE: CPT | Performed by: FAMILY MEDICINE

## 2023-05-31 ASSESSMENT — PAIN SCALES - GENERAL: PAINLEVEL: NO PAIN (1)

## 2023-05-31 NOTE — PATIENT INSTRUCTIONS
I'd like you to have some labwork done today to check for hormone levels related to breast tissue development. Please stop at the lab today and have your blood drawn and I will get back to you with results.     Also Please set up a breast ultrasound at your convenience. You can call the main appointment line at [phone] to set this up.

## 2023-05-31 NOTE — PROGRESS NOTES
"  Assessment & Plan       ICD-10-CM    1. Gynecomastia, male  N62 US Breast Left Limited 1-3 Quadrants     Prolactin     Testosterone, total     Luteinizing Hormone     Follicle stimulating hormone     Follicle stimulating hormone     Luteinizing Hormone     Testosterone, total     Prolactin      2. Subareolar mass of left breast  N63.42 US Breast Left Limited 1-3 Quadrants     Prolactin     Prolactin      3. Hypothyroidism due to Hashimoto's thyroiditis  E03.8 TSH WITH FREE T4 REFLEX    E06.3 TSH WITH FREE T4 REFLEX         He does have a history of hypothyroidism and has not been checked in a little while so this is very possibly connected to the gynecomastia.  This does not feel like a malignant lump but nevertheless should be ruled out for cancer as well.  I am going to check some hormone levels as noted above, and a breast ultrasound.  I do not think mammogram is warranted at this time but will be considered depending on above findings.  We did talk about the need to follow his thyroid regularly at least once a year and more often if levels are often need to be adjusted.  It should be okay for him to run on the slightly subclinical side as long as he feels well and his labs are within reason.  He is not on any medications other than thyroid medication so not likely a medication effect causing the gynecomastia.    20 minutes spent by me on the date of the encounter doing chart review, history and exam, documentation and further activities per the note     BMI:   Estimated body mass index is 30.38 kg/m  as calculated from the following:    Height as of this encounter: 1.895 m (6' 2.6\").    Weight as of this encounter: 109.1 kg (240 lb 8 oz).   Weight management plan: discussed relationship to thyroid and weight but not otherwise addressed today      Marianna Farah MD  [facility] is a 37 year [facility], presenting for the following health issues:  Mass (Lump " under nipple Left side)        5/31/2023     6:57 AM   Additional Questions   Roomed by Shelbi Tovar    History of Present Illness       Reason for visit:  Lump under left nipple  Symptom onset:  More than a month  Symptoms include:  Lump, pain  Symptom intensity:  Mild  Symptom progression:  Staying the same  Had these symptoms before:  No    He eats 0-1 servings of fruits and vegetables daily.He consumes 0 sweetened beverage(s) daily.He exercises with enough effort to increase his heart rate 9 or less minutes per day.  He exercises with enough effort to increase his heart rate 3 or less days per week.   He is taking medications regularly.     He noticed this about 1 month ago.  It was a little bit tender so he checked the area and feels like that side is larger than the right.  It has not really changed since then.  It is a little bit tender and has not gone away.  He has never had any kind of breast issues before.  He has not noticed any drainage from the nipple.  There is some family history in females in his family including a paternal grandmother and paternal aunt but no male breast cancer.    He has a personal history of thyroid disease due to Hashimoto's thyroiditis, has been hypothyroid since the time of diagnosis in approximately 2019.  He used to follow with an endocrinologist but has really not had this followed lately and his last thyroid levels were done quite a while ago.  When he initially was diagnosed he states his TSH was in the 20s and he dropped quickly down into the 6 level and he did not feel well when it ran below 4.  He states he felt really jittery and his heart was racing  He has not had any cardiac symptoms recently, no palpitations, no sweats or temperature abnormalities, no trouble with his weight management or his bowels.  He feels well currently on his dose of [place] Thyroid.  He had done levothyroxine initially and did not tolerate that.  Has never been diagnosed with other  "hormonal issues or hypogonadism, no issues with sexual function or fertility.  He has a infant daughter with his wife.    Review of Systems   Constitutional, HEENT, cardiovascular, pulmonary, gi and gu systems are negative, except as otherwise noted.      Objective    /70   Pulse 68   Temp 97.7  F (36.5  C) (Temporal)   Resp 18   Ht 1.895 m (6' 2.6\")   Wt 109.1 kg (240 lb 8 oz)   SpO2 98%   BMI 30.38 kg/m    Body mass index is 30.38 kg/m .  Physical Exam   Vitals noted.  Patient alert, oriented, and in no acute distress.   Neck:  Supple without lymphadenopathy, JVD or masses.   CV:  RRR without murmur.   Respiratory:  Lungs clear to auscultation bilaterally.   Right breast is normal.   Left breast has subareolar thickening, feels rubbery, concentric, approximately 2 cm width of enlarged breast tissue. No hard mass.     Orders Placed This Encounter   Procedures     US Breast Left Limited 1-3 Quadrants     TSH WITH FREE T4 REFLEX     Prolactin     Testosterone, total     Luteinizing Hormone     Follicle stimulating hormone                  "

## 2023-06-02 LAB — TESTOST SERPL-MCNC: 359 NG/DL (ref 8–950)

## 2023-06-07 RX ORDER — THYROID 90 MG/1
90 TABLET ORAL DAILY
Qty: 90 TABLET | Refills: 1 | Status: SHIPPED | OUTPATIENT
Start: 2023-06-07 | End: 2023-06-09 | Stop reason: DRUGHIGH

## 2023-06-07 NOTE — RESULT ENCOUNTER NOTE
Norma, I tried reaching you but your voicemail box is full and cannot take messages.   We need to increase your thyroid dose. If you HAVE been taking your medication consistently, then I want to raise it to 90 mg daily. If you have NOT been taking it consistently, then you just need to start taking it consistently and have it rechecked in 2 months. I am going to send in a new Rx for the 90 mg dose just in case.   Please pick that up and start taking it tomorrow morning, once daily. Then make a lab appointment in 2 months to have your level rechecked.   Let me know if you have any questions.   Marianna Farah MD

## 2023-06-07 NOTE — RESULT ENCOUNTER NOTE
Norma, your thyroid is too low. This may very well be why you have that lump. You need to be getting more thyroid hormone. I'm going to call you to discuss.   Marianna Farah MD

## 2023-06-08 ENCOUNTER — TELEPHONE (OUTPATIENT)
Dept: FAMILY MEDICINE | Facility: CLINIC | Age: 38
End: 2023-06-08
Payer: COMMERCIAL

## 2023-06-08 DIAGNOSIS — E06.3 HYPOTHYROIDISM DUE TO HASHIMOTO'S THYROIDITIS: Primary | ICD-10-CM

## 2023-06-08 NOTE — TELEPHONE ENCOUNTER
Patient called back - given information from provider.    He is currently taking 30mg daily of his thyroid medication.  He DOES NOT want to increase to 60 or 90mg - patient would like to increase to 40 or 45mg if possible and recheck in 2 months.    Please let patient know if this is possible.    Pharm[facility] - [facility]          Norma, I tried reaching you but your voicemail box is full and cannot take messages.   We need to increase your thyroid dose. If you HAVE been taking your medication consistently, then I want to raise it to 90 mg daily. If you have NOT been taking it consistently, then you just need to start taking it consistently and have it rechecked in 2 months. I am going to send in a new Rx for the 90 mg dose just in case.   Please pick that up and start taking it tomorrow morning, once daily. Then make a lab appointment in 2 months to have your level rechecked.   Let me know if you have any questions.   Marianna Farah MD

## 2023-06-09 RX ORDER — THYROID 30 MG/1
30 TABLET ORAL DAILY
Qty: 90 TABLET | Refills: 0 | Status: SHIPPED | OUTPATIENT
Start: 2023-06-09 | End: 2023-09-18 | Stop reason: DRUGHIGH

## 2023-06-09 RX ORDER — THYROID 15 MG/1
15 TABLET ORAL DAILY
Qty: 90 TABLET | Refills: 0 | Status: SHIPPED | OUTPATIENT
Start: 2023-06-09 | End: 2023-09-18 | Stop reason: DRUGHIGH

## 2023-06-09 NOTE — TELEPHONE ENCOUNTER
Message handled by Nurse Triage with [employer] - provider name: Sofi.    Patient can increase to 45 mg and recheck tsh in 2 months.    Yolis Michael RN on 6/9/2023 at 4:01 PM

## 2023-06-18 ENCOUNTER — HEALTH MAINTENANCE LETTER (OUTPATIENT)
Age: 38
End: 2023-06-18

## 2023-06-26 ENCOUNTER — ANCILLARY PROCEDURE (OUTPATIENT)
Dept: MAMMOGRAPHY | Facility: CLINIC | Age: 38
End: 2023-06-26
Attending: FAMILY MEDICINE
Payer: COMMERCIAL

## 2023-06-26 DIAGNOSIS — N62 GYNECOMASTIA, MALE: ICD-10-CM

## 2023-06-26 DIAGNOSIS — N63.42 SUBAREOLAR MASS OF LEFT BREAST: ICD-10-CM

## 2023-06-26 PROCEDURE — G0279 TOMOSYNTHESIS, MAMMO: HCPCS | Performed by: RADIOLOGY

## 2023-06-26 PROCEDURE — 77066 DX MAMMO INCL CAD BI: CPT | Performed by: RADIOLOGY

## 2023-06-26 NOTE — LETTER
Norma Garcia  [address] [place] [id number]            June 26, 2023    Date of Exam:     Dear Norma:    Thank you for your recent visit.     Breast Imaging Result: We are pleased to inform you that the results of your recent breast imaging show no evidence of malignancy (cancer). Please check with your health care team for instructions on follow-up based on your medical history and physical exam findings.    Remember that negative breast imaging does not exclude the possibility of breast disease.  You should never ignore a breast lump or any other change in your breasts, even if the breast imaging is normal.  If you are experiencing any breast problems such as a lump or localized pain we request that you discuss this with your health care team if you haven t already done so, as additional testing may be necessary.    A report of your breast imaging results was sent to: Marianna Farah    Your breast imaging will become part of your medical file here at [facility] for at least 10 years. You are responsible for informing any new health care team or breast imaging facility of the date and location of this examination.     We appreciate the opportunity to participate in your health care.    Sincerely,  Dr. Keo CAMPBELL [facility]

## 2023-06-26 NOTE — RESULT ENCOUNTER NOTE
Norma, I am happy to see that the mammogram only shows thickened breast tissue consistent with gynecomastia, nothing concerning for cancer.  No signs of infection or other abnormality.  I highly suspect this is related to your thyroid disease.  We will see what your repeat level shows and hopefully things will improve when your thyroid is stabilized.  Marianna Farah MD

## 2023-09-14 DIAGNOSIS — E06.3 HYPOTHYROIDISM DUE TO HASHIMOTO'S THYROIDITIS: ICD-10-CM

## 2023-09-15 ENCOUNTER — LAB (OUTPATIENT)
Dept: LAB | Facility: CLINIC | Age: 38
End: 2023-09-15
Payer: COMMERCIAL

## 2023-09-15 DIAGNOSIS — E06.3 HYPOTHYROIDISM DUE TO HASHIMOTO'S THYROIDITIS: ICD-10-CM

## 2023-09-15 LAB
T4 FREE SERPL-MCNC: 0.7 NG/DL (ref 0.9–1.7)
TSH SERPL DL<=0.005 MIU/L-ACNC: 10.52 UIU/ML (ref 0.3–4.2)

## 2023-09-15 PROCEDURE — 84439 ASSAY OF FREE THYROXINE: CPT

## 2023-09-15 PROCEDURE — 36415 COLL VENOUS BLD VENIPUNCTURE: CPT

## 2023-09-15 PROCEDURE — 84443 ASSAY THYROID STIM HORMONE: CPT

## 2023-09-15 RX ORDER — THYROID,PORK 15 MG
TABLET ORAL
Qty: 90 TABLET | Refills: 0 | OUTPATIENT
Start: 2023-09-15

## 2023-09-15 RX ORDER — THYROID 15 MG/1
15 TABLET ORAL DAILY
Qty: 90 TABLET | Refills: 0 | Status: CANCELLED | OUTPATIENT
Start: 2023-09-15

## 2023-09-15 RX ORDER — THYROID 30 MG/1
30 TABLET ORAL DAILY
Qty: 90 TABLET | Refills: 0 | Status: CANCELLED | OUTPATIENT
Start: 2023-09-15

## 2023-09-15 RX ORDER — THYROID 60 MG/1
60 TABLET ORAL DAILY
Qty: 90 TABLET | Refills: 0 | Status: SHIPPED | OUTPATIENT
Start: 2023-09-15

## 2023-09-15 RX ORDER — THYROID 30 MG/1
TABLET ORAL
Qty: 90 TABLET | Refills: 0 | OUTPATIENT
Start: 2023-09-15

## 2023-09-15 NOTE — TELEPHONE ENCOUNTER
Notify him that his dose needs to change again, he is still not getting enough.  I am increasing him to 60 mg daily and he needs to have his TSH rechecked again in 2 months.  Marianna Farah MD    Left breast Ca

## 2023-09-18 NOTE — RESULT ENCOUNTER NOTE
Norma, your thyroid is still too low. Hopefully you got the message to increase your dose, and I sent in a new Rx for the 60 mg dose. Take 1 daily and please have your level rechecked again in 2 months. I have orders in for you.   Marianna Farah MD

## 2024-05-08 ENCOUNTER — TELEPHONE (OUTPATIENT)
Dept: FAMILY MEDICINE | Facility: CLINIC | Age: 39
End: 2024-05-08

## 2024-05-08 NOTE — TELEPHONE ENCOUNTER
Patient Quality Outreach    Patient is due for the following:   Depression  -  PHQ-2  Physical Preventive Adult Physical    Next Steps:   Schedule a Annual Wellness Visit  Patient was assigned appropriate questionnaire to complete    Type of outreach:    Sent [employer] message.      Questions for provider review:    None           Carolyn Jonas

## 2024-07-16 NOTE — TELEPHONE ENCOUNTER
Patient Quality Outreach    Patient is due for the following:   Depression  -  PHQ-2  Physical Preventive Adult Physical    Next Steps:   Patient has upcoming appointment, these items will be addressed at that time.    Type of outreach:    Chart review performed, no outreach needed.    Next Steps:  Reach out within 90 days via [employer].    Max number of attempts reached: Yes. Will try again in 90 days if patient still on fail list.    Questions for provider review:    None           Carolyn Jonas

## 2024-07-18 ENCOUNTER — TELEPHONE (OUTPATIENT)
Dept: FAMILY MEDICINE | Facility: CLINIC | Age: 39
End: 2024-07-18

## 2024-07-18 ENCOUNTER — OFFICE VISIT (OUTPATIENT)
Dept: FAMILY MEDICINE | Facility: CLINIC | Age: 39
End: 2024-07-18
Payer: COMMERCIAL

## 2024-07-18 VITALS
TEMPERATURE: 98.3 F | SYSTOLIC BLOOD PRESSURE: 136 MMHG | RESPIRATION RATE: 12 BRPM | WEIGHT: 229.2 LBS | BODY MASS INDEX: 28.5 KG/M2 | DIASTOLIC BLOOD PRESSURE: 79 MMHG | HEIGHT: 75 IN | HEART RATE: 76 BPM | OXYGEN SATURATION: 96 %

## 2024-07-18 DIAGNOSIS — G47.33 OBSTRUCTIVE SLEEP APNEA SYNDROME: Primary | ICD-10-CM

## 2024-07-18 DIAGNOSIS — E06.3 HYPOTHYROIDISM DUE TO HASHIMOTO'S THYROIDITIS: ICD-10-CM

## 2024-07-18 DIAGNOSIS — R03.0 ELEVATED BP WITHOUT DIAGNOSIS OF HYPERTENSION: ICD-10-CM

## 2024-07-18 PROCEDURE — 99213 OFFICE O/P EST LOW 20 MIN: CPT | Performed by: FAMILY MEDICINE

## 2024-07-18 ASSESSMENT — PAIN SCALES - GENERAL: PAINLEVEL: NO PAIN (0)

## 2024-07-18 NOTE — PROGRESS NOTES
"  Assessment & Plan     (G47.33) Obstructive sleep apnea syndrome  (primary encounter diagnosis)  Comment: Ref for diagnosis clarification with Sleep med. Pt requesting repeat PSG for eval of TERRY diagnosis and to see if he needs to use CPAP. He is down 11# since last year and reports no improvement in fatigue with CPAP but that once his thyroid meds were titrated he felt better.   Plan: Adult Sleep Eval & Management          Referral    (E03.8,  E06.3) Hypothyroidism due to Hashimoto's thyroiditis  Comment: Pt on [place] but getting from endo in [place] per his report. Not interested in Endo referral at this time.     (R03.0) Elevated BP without diagnosis of hypertension  Comment: Discussed DASH diet. And discussed how TERRY may contribute to HTN. Pt amenable to Trial of DASH diet. Repeat BP wnl.              BMI  Estimated body mass index is 28.72 kg/m  as calculated from the following:    Height as of this encounter: 1.903 m (6' 2.91\").    Weight as of this encounter: 104 kg (229 lb 3.2 oz).   Weight management plan: Discussed healthy diet and exercise guidelines      Ref sleep. Follow up CARLITOS Green is a 39 year old, presenting for the following health issues:  CPAP Follow Up and Sleep Apnea        7/18/2024    12:47 PM   Additional Questions   Roomed by Erin         7/18/2024    12:47 PM   Patient Reported Additional Medications   Patient reports taking the following new medications none     Patient well overall no acute issues. Here for follow up on CPAP. 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   Pt had diagnosis of TERRY several years ago but at the same time was battling with thyroid dysfunction and med management. Feels like PSG initially was abnormal due to this thyroid issue and felt like CPAP was not effective.     History of Present Illness       Reason for visit:  Sleep apnea    He eats 2-3 servings of fruits and vegetables daily.He " "consumes 0 sweetened beverage(s) daily.He exercises with enough effort to increase his heart rate 10 to 19 minutes per day.  He exercises with enough effort to increase his heart rate 4 days per week.   He is taking medications regularly.         Patient was diagnosed with sleep apnea in 2020-ish. Patient was using the CPAP regularly at first, then there was \"crap\" in the tube and a recall on the CPAP. Patient received a new CPAP and discontinued usage as it gave patient a \"bad taste in the mouth.\" Patient would like to discuss if the CPAP is still needed and if so patient would like to look into a new one but is amendable to using it again if needed.   How many servings of fruits and vegetables do you eat daily?  2-3  On average, how many sweetened beverages do you drink each day (Examples: soda, juice, sweet tea, etc.  Do NOT count diet or artificially sweetened beverages)?   0  How many days per week do you exercise enough to make your heart beat faster? 4  How many minutes a day do you exercise enough to make your heart beat faster? 10 - 19  How many days per week do you miss taking your medication? 0        Objective    /79 (BP Location: Right arm, Patient Position: Sitting, Cuff Size: Adult Large)   Pulse 76   Temp 98.3  F (36.8  C) (Oral)   Resp 12   Ht 1.903 m (6' 2.91\")   Wt 104 kg (229 lb 3.2 oz)   SpO2 96%   BMI 28.72 kg/m    Body mass index is 28.72 kg/m .  Physical Exam  Constitutional:       Appearance: Normal appearance.   HENT:      Head: Normocephalic.   Cardiovascular:      Rate and Rhythm: Normal rate and regular rhythm.      Pulses: Normal pulses.   Pulmonary:      Effort: Pulmonary effort is normal. No respiratory distress.      Breath sounds: Normal breath sounds. No stridor. No wheezing, rhonchi or rales.   Musculoskeletal:         General: Normal range of motion.   Skin:     General: Skin is warm and dry.      Capillary Refill: Capillary refill takes less than 2 seconds. "   Neurological:      Mental Status: He is alert. Mental status is at baseline.   Psychiatric:         Mood and Affect: Mood normal.         Behavior: Behavior normal.         Thought Content: Thought content normal.         Judgment: Judgment normal.                Signed Electronically by: Columba Velez MD

## 2024-07-23 ENCOUNTER — OFFICE VISIT (OUTPATIENT)
Dept: SLEEP MEDICINE | Facility: CLINIC | Age: 39
End: 2024-07-23
Attending: FAMILY MEDICINE
Payer: COMMERCIAL

## 2024-07-23 VITALS
DIASTOLIC BLOOD PRESSURE: 86 MMHG | SYSTOLIC BLOOD PRESSURE: 131 MMHG | HEIGHT: 75 IN | OXYGEN SATURATION: 100 % | RESPIRATION RATE: 16 BRPM | HEART RATE: 61 BPM | WEIGHT: 221.2 LBS | BODY MASS INDEX: 27.5 KG/M2

## 2024-07-23 DIAGNOSIS — G47.33 OBSTRUCTIVE SLEEP APNEA SYNDROME: Primary | ICD-10-CM

## 2024-07-23 PROCEDURE — 99203 OFFICE O/P NEW LOW 30 MIN: CPT | Performed by: INTERNAL MEDICINE

## 2024-07-23 RX ORDER — MULTIPLE VITAMINS W/ MINERALS TAB 9MG-400MCG
1 TAB ORAL DAILY
COMMUNITY

## 2024-07-23 ASSESSMENT — SLEEP AND FATIGUE QUESTIONNAIRES
HOW LIKELY ARE YOU TO NOD OFF OR FALL ASLEEP WHILE SITTING AND READING: SLIGHT CHANCE OF DOZING
HOW LIKELY ARE YOU TO NOD OFF OR FALL ASLEEP WHILE WATCHING TV: MODERATE CHANCE OF DOZING
HOW LIKELY ARE YOU TO NOD OFF OR FALL ASLEEP IN A CAR, WHILE STOPPED FOR A FEW MINUTES IN TRAFFIC: WOULD NEVER DOZE
HOW LIKELY ARE YOU TO NOD OFF OR FALL ASLEEP WHILE LYING DOWN TO REST IN THE AFTERNOON WHEN CIRCUMSTANCES PERMIT: MODERATE CHANCE OF DOZING
HOW LIKELY ARE YOU TO NOD OFF OR FALL ASLEEP WHEN YOU ARE A PASSENGER IN A CAR FOR AN HOUR WITHOUT A BREAK: WOULD NEVER DOZE
HOW LIKELY ARE YOU TO NOD OFF OR FALL ASLEEP WHILE SITTING QUIETLY AFTER LUNCH WITHOUT ALCOHOL: SLIGHT CHANCE OF DOZING
HOW LIKELY ARE YOU TO NOD OFF OR FALL ASLEEP WHILE SITTING INACTIVE IN A PUBLIC PLACE: SLIGHT CHANCE OF DOZING
HOW LIKELY ARE YOU TO NOD OFF OR FALL ASLEEP WHILE SITTING AND TALKING TO SOMEONE: WOULD NEVER DOZE

## 2024-07-23 NOTE — PROGRESS NOTES
Additional 15 minutes on the date of service was spent performing the following:    -Preparing to see the patient  -Obtaining and/or reviewing separately obtained history   -Ordering medications, tests, or procedures   -Documenting clinical information in the electronic or other health record     Thank you for the opportunity to participate in the care of Norma Garcia.     He is a 39 year old y/o male patient who comes to the sleep medicine clinic for second opinion.  The patient was diagnosed with obstructive sleep apnea at another sleep center in 2020.  He was started on CPAP therapy but is no longer on it.  Since that time he has lost some weight and questions whether he needs to be on CPAP anymore.  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     Assessment and Plan:  In summary Norma Garcia is a 39 year old year old male who is here for follow up.    1. Obstructive sleep apnea syndrome  I will put an order for the patient to get a repeat in lab sleep study to rule out obstructive sleep apnea.  Turn to clinic to review the results after it has been interpreted.  - Adult Sleep Eval & Management  Referral  - Comprehensive Sleep Study; Future    Lab reviewed: Discussed with patient.    Ideal Sleep-Wake Cycle:    The patient likes to initiate sleep at around 8-9 PM. Final wake up time is around 4-5 AM.    TIME IN BED:    1) Work/School Days:    Do you work or go to school? Yes   What time do you usually get into bed? 9   About how long does it take you to fall asleep? a couple minutes   How often do you have trouble falling asleep? zero   How often do you wake up during the night? several   Do you work days/evenings/nights/rotating shifts? Days   What wakes you up at night? Uncertain   How often do you have trouble falling back to sleep? not often   About how long does it take to fall back to sleep? quickly   What do you usually do if you have trouble getting back to sleep?  toss and turn   What time do you usually get out of bed to start your day? 5   Do you use an alarm? Yes   2) Weekends/Non-work Days/All Other Days    What time do you usually get into bed? 9   About how long does it take you to fall asleep? few minutes   What time do you usually get out of bed to start your day? 6   Do you use an alarm? No   SLEEP NEED    On average, about how much sleep do you think you get? 7   About how much sleep do you think you need? 8   SLEEP POSITION    Which sleep positions do you prefer? Back    Side   Do you do any of the following activities in bed? Use phone, computer, or tablet   How often do you take a nap on purpose? 0   About how long are your naps?    Do you feel better after naps?    How often do you doze off unintentionally? 3   Have you ever had a driving accident or near-miss due to sleepiness/drowsiness? No   Stop Bang Questionnaire    Question 7/23/2024  8:30 AM CDT - Filed by Patient   Do you SNORE loudly (louder than talking or loud enough to be heard through closed doors)? No   Do you often feel TIRED, fatigued, or sleepy during daytime? Yes   Has anyone OBSERVED you stop breathing during your sleep? No   Do you have or are you being treated for high blood PRESSURE? No   BMI more than 35kg/m2? No   AGE over 50 years old? No   NECK circumference > 16 inches (40cm)? No   GENDER: Male? Yes   STOP-BANG Total Score (range: 0 - 8) 1 (Low risk of TERRY)       ANGELLA:  ANGELLA Total Score: 12  Total score - [place]: 7 (7/23/2024  8:29 AM)    Failed to redirect to the Timeline version of the [employer] SmartLink.   Patient Active Problem List   Diagnosis    CARDIOVASCULAR SCREENING; LDL GOAL LESS THAN 160    Psoriasis    Hypothyroidism due to Hashimoto's thyroiditis       Past Medical History:   Diagnosis Date    Psoriasis     glans    Thyroid disease        Past Surgical History:   Procedure Laterality Date    NO HISTORY OF SURGERY         Current Outpatient Medications   Medication Sig  "Dispense Refill    multivitamin w/minerals (THERA-VIT-M) tablet Take 1 tablet by mouth daily      thyroid (ARMOUR) 60 MG tablet Take 1 tablet (60 mg) by mouth daily 90 tablet 0    Vitamin D3 (CHOLECALCIFEROL) 125 MCG (5000 UT) tablet Take by mouth daily         Allergies   Allergen Reactions    No Known Drug Allergy        Physical Exam:  /86   Pulse 61   Resp 16   Ht 1.905 m (6' 3\")   Wt 100.3 kg (221 lb 3.2 oz)   SpO2 100%   BMI 27.65 kg/m    BMI:Body mass index is 27.65 kg/m .   GEN: NAD, appropriate for age  Head: Normocephalic.  EYES: EOMI  Psych: normal mood, normal affect  Patient declined any other physical exams.    Labs/Studies:      Lab Results   Component Value Date    PH 6.0 11/07/2001     Lab Results   Component Value Date    TSH 10.52 (H) 09/15/2023    TSH 13.03 (H) 05/31/2023     Lab Results   Component Value Date     (H) 01/17/2023     (H) 01/04/2020     Lab Results   Component Value Date    HGB 15.5 01/04/2020    HGB 14.1 10/19/2019     Lab Results   Component Value Date    BUN 16 01/17/2023    BUN 18 01/04/2020    CR 1.09 01/17/2023    CR 1.17 01/04/2020     Lab Results   Component Value Date    AST 31 01/17/2023    AST 18 01/07/2020    ALT 59 01/17/2023    ALT 25 01/07/2020    ALKPHOS 94 01/17/2023    ALKPHOS 91 01/07/2020    BILITOTAL 0.5 01/17/2023    BILITOTAL 0.6 01/07/2020     Recent Labs   Lab Test 01/17/23  0848 01/04/20  1155    139   POTASSIUM 4.1 3.8   CHLORIDE 107 105   CO2 29 28   ANIONGAP 3 6   * 111*   BUN 16 18   CR 1.09 1.17   ELIZ 9.2 9.2       Patient verbalized understanding of these issues, agrees with the plan and all questions were answered today. Patient was given an opportuntity to voice any other symptoms or concerns not listed above. Patient did not have any other symptoms or concerns.      Franky Contreras DO  Board Certified in Internal Medicine and Sleep Medicine    (Note created with Dragon voice recognition and unintended " spelling errors and word substitutions may occur)     Audio and visual devices were used for this virtual clinic visit with permission from patient.

## 2024-07-23 NOTE — NURSING NOTE
PSG and follow-up appointment with provider have both been scheduled. PSG hand-out given to and reviewed with patient at clinic visit. AVS printed and given to patient.  Regina Domínguez, GAURANG

## 2024-07-23 NOTE — NURSING NOTE
"Chief Complaint   Patient presents with    Sleep Problem     Patient was previously diagnosed with sleep apnea and had been a regular CPAP user. Since that time his CPAP was recalled and he got out of the habit of using it. Got a new one, but as he lost weight and didn't seem to have symptoms anymore he is out of the habit.  He also hates wearing it. Wondering if he should get a new sleep study to see if he still needs it, especially as he is considering using his [employer] license again.       Initial /86   Pulse 61   Resp 16   Ht 1.905 m (6' 3\")   Wt 100.3 kg (221 lb 3.2 oz)   SpO2 100%   BMI 27.65 kg/m   Estimated body mass index is 27.65 kg/m  as calculated from the following:    Height as of this encounter: 1.905 m (6' 3\").    Weight as of this encounter: 100.3 kg (221 lb 3.2 oz).    Medication Reconciliation: complete  ESS: 7  Neck circumference: 16.25 inches / 41 centimeters.  DME: Was using [facility]. Now would use [facility] if needed.  Regina Domínguez, GAURANG      "

## 2024-07-23 NOTE — PATIENT INSTRUCTIONS
Patient education: What is a sleep study?     What is a sleep study? -- A sleep study is a test that measures how well you sleep and checks for sleep problems. For some sleep studies, you stay overnight in a sleep lab at a hospital or sleep center.     What happens during a sleep study? -- Before you go to sleep, a technician attaches small, sticky patches called  electrodes  to your head, chest, and legs. He or she will also place a small tube beneath your nose and might wrap 1 or 2 belts around your chest.   Each of these items has wires that connect to monitors. The monitors record your movement, brain activity, breathing, and other body functions while you sleep.  If you have a history of trouble falling asleep, your doctor might prescribe a medicine to help you fall asleep in the lab. If you have never taken the medicine before, your doctor might ask you take it on a night before your sleep study to see how it affects you.   Why might my doctor order a sleep study? -- Your doctor will order a sleep study if he or she thinks you have sleep apnea or a different condition that makes you:   ?Have sudden jerking leg movements while you sleep, called  periodic limb movements.    ?Feel very sleepy during the day and fall asleep all of a sudden, called  narcolepsy.    ?Have trouble falling asleep or staying asleep over a long period of time, called  chronic insomnia.    ?Do odd things while you sleep, such as walking.  How should I prepare for a sleep study? -- On the day of your sleep study, you should:   ?Avoid alcohol   ?Avoid drinking coffee, tea, sodas, and other drinks that have caffeine in the afternoon and evening   ?Take all of your regular medicines     The cost of care estimate line is [phone]. They are able to give the patient an estimate of the charges and also an estimate of their insurance coverage/patient responsibility.   After your sleep study is performed, please call us at [phone] or  [phone]  to schedule for a follow up to review the results of the sleep study.    Please bring one tab of low dose melatonin 3 mg or less to the night of the study.    Melatonin intake is completely voluntary.    You may take own melatonin after arrival to sleep center. Do not drive or operate machinery after intake of melatonin.     Please make every effort you can to sleep on your back with one pillow behind your head.    Please also call your insurance company next week to see if your sleep study is approved and find out your co-pay.

## 2024-08-09 ENCOUNTER — HOSPITAL ENCOUNTER (EMERGENCY)
Facility: CLINIC | Age: 39
Discharge: HOME | End: 2024-08-09
Attending: EMERGENCY MEDICINE | Admitting: EMERGENCY MEDICINE
Payer: COMMERCIAL

## 2024-08-09 VITALS
WEIGHT: 231 LBS | DIASTOLIC BLOOD PRESSURE: 87 MMHG | RESPIRATION RATE: 18 BRPM | OXYGEN SATURATION: 100 % | HEART RATE: 78 BPM | SYSTOLIC BLOOD PRESSURE: 146 MMHG | BODY MASS INDEX: 28.72 KG/M2 | TEMPERATURE: 98.3 F | HEIGHT: 75 IN

## 2024-08-09 DIAGNOSIS — S60.512A ABRASION OF LEFT HAND, INITIAL ENCOUNTER: ICD-10-CM

## 2024-08-09 PROCEDURE — 99283 EMERGENCY DEPT VISIT LOW MDM: CPT | Performed by: EMERGENCY MEDICINE

## 2024-08-09 ASSESSMENT — COLUMBIA-SUICIDE SEVERITY RATING SCALE - C-SSRS
6. HAVE YOU EVER DONE ANYTHING, STARTED TO DO ANYTHING, OR PREPARED TO DO ANYTHING TO END YOUR LIFE?: NO
2. HAVE YOU ACTUALLY HAD ANY THOUGHTS OF KILLING YOURSELF IN THE PAST MONTH?: NO
1. IN THE PAST MONTH, HAVE YOU WISHED YOU WERE DEAD OR WISHED YOU COULD GO TO SLEEP AND NOT WAKE UP?: NO

## 2024-08-09 NOTE — ED TRIAGE NOTES
Pt reports that he was pressure washing and grazed his left hand with the wand. Concerns for injection injury. Small abrasion to left hand.     Triage Assessment (Adult)       Row Name 08/09/24 1093          Triage Assessment    Airway WDL WDL        Respiratory WDL    Respiratory WDL WDL        Skin Circulation/Temperature WDL    Skin Circulation/Temperature WDL WDL        Cardiac WDL    Cardiac WDL WDL        Peripheral/Neurovascular WDL    Peripheral Neurovascular WDL WDL        Cognitive/Neuro/Behavioral WDL    Cognitive/Neuro/Behavioral WDL WDL

## 2024-08-09 NOTE — DISCHARGE INSTRUCTIONS
Return if significant pain develops or any concerns.  May use ibuprofen up to 600 mg 4 times a day for discomfort.  May also use Tylenol 1000 mg 4 times a day.

## 2024-08-09 NOTE — ED PROVIDER NOTES
History     Chief Complaint   Patient presents with    Hand Injury     HPI  Norma Garcia is a 39 year old male who presents with an injury from a  to the left hand.  This occurred just earlier today.  He was cleaning out of the tub at home with water when it struck him at the base of the left thumb.  No other injury.  No significant pain with this.  Normal range of motion of the hand.  He believes he had a tetanus shot 2 years ago.    Allergies:  Allergies   Allergen Reactions    No Known Drug Allergy        Problem List:    Patient Active Problem List    Diagnosis Date Noted    Hypothyroidism due to Hashimoto's thyroiditis 04/03/2013     Priority: Medium    Psoriasis 03/30/2012     Priority: Medium    CARDIOVASCULAR SCREENING; LDL GOAL LESS THAN 160 10/31/2010     Priority: Medium        Past Medical History:    Past Medical History:   Diagnosis Date    Psoriasis     Thyroid disease        Past Surgical History:    Past Surgical History:   Procedure Laterality Date    NO HISTORY OF SURGERY         Family History:    Family History   Problem Relation Age of Onset    Sleep Apnea Father     Diabetes Maternal Grandmother     Neurologic Disorder Paternal Grandmother         migraine headaches    Breast Cancer Paternal Grandmother     Breast Cancer Paternal Aunt        Social History:  Marital Status:   [2]  Social History     Tobacco Use    Smoking status: Never     Passive exposure: Never    Smokeless tobacco: Never   Vaping Use    Vaping status: Never Used   Substance Use Topics    Alcohol use: Not Currently    Drug use: No        Medications:    amoxicillin-clavulanate (AUGMENTIN) 875-125 MG tablet  multivitamin w/minerals (THERA-VIT-M) tablet  thyroid (ARMOUR) 60 MG tablet  Vitamin D3 (CHOLECALCIFEROL) 125 MCG (5000 UT) tablet          Review of Systems  All other systems are reviewed and are negative    Physical Exam   BP: (!) 146/87  Pulse: 78  Temp: 98.3  F (36.8  C)  Resp: 18  Height:  "190.5 cm (6' 3\")  Weight: 104.8 kg (231 lb)  SpO2: 100 %      Physical Exam  Vitals reviewed.   Constitutional:       General: He is not in acute distress.     Appearance: He is not diaphoretic.   HENT:      Head: Normocephalic and atraumatic.   Eyes:      General: No scleral icterus.        Right eye: No discharge.         Left eye: No discharge.      Conjunctiva/sclera: Conjunctivae normal.   Pulmonary:      Effort: Pulmonary effort is normal.      Breath sounds: No stridor.   Musculoskeletal:      Cervical back: Normal range of motion.      Comments: Left thumb reveals abrasion over the lateral aspect of the carpal metacarpal joint.  No deep tissue swelling.  Normal range of motion to the thumb and hand.  CMS to the hand intact.   Skin:     General: Skin is warm and dry.      Findings: No rash.   Neurological:      Mental Status: He is alert.      Comments: Normal speech and mentation   Psychiatric:         Judgment: Judgment normal.         ED Course        Procedures                  No results found for this or any previous visit (from the past 24 hour(s)).    Medications - No data to display    Assessments & Plan (with Medical Decision Making)  39-year-old male who with injury from  from water just prior to arrival.  On the surface, this appears superficial.  Some abrasions.  No external signs of deep tissue involvement.  Very normal movement of the thumb and fingers.  Offered x-ray which she declined reasonably at this point.  Also recommended a tetanus but he is certain he had one 2 years ago and declined.  Placed on Augmentin.  At this point, appears appropriate for discharge home.  Will need close monitoring.  Return if significant pain develops or any other concern.     I have reviewed the nursing notes.    I have reviewed the findings, diagnosis, plan and need for follow up with the patient.          New Prescriptions    AMOXICILLIN-CLAVULANATE (AUGMENTIN) 875-125 MG TABLET    Take 1 " tablet by mouth 2 times daily for 4 days       Final diagnoses:   Abrasion of left hand, initial encounter       8/9/2024   [facility] EMERGENCY DEPT       Pepe Bates MD  08/09/24 8752

## 2024-08-11 ENCOUNTER — HEALTH MAINTENANCE LETTER (OUTPATIENT)
Age: 39
End: 2024-08-11

## 2024-08-13 ENCOUNTER — THERAPY VISIT (OUTPATIENT)
Dept: SLEEP MEDICINE | Facility: CLINIC | Age: 39
End: 2024-08-13
Payer: COMMERCIAL

## 2024-08-13 DIAGNOSIS — G47.33 OBSTRUCTIVE SLEEP APNEA SYNDROME: ICD-10-CM

## 2024-08-13 PROCEDURE — 95810 POLYSOM 6/> YRS 4/> PARAM: CPT | Performed by: INTERNAL MEDICINE

## 2024-09-03 LAB — SLPCOMP: NORMAL

## 2024-09-18 ENCOUNTER — OFFICE VISIT (OUTPATIENT)
Dept: SLEEP MEDICINE | Facility: CLINIC | Age: 39
End: 2024-09-18
Payer: COMMERCIAL

## 2024-09-18 VITALS
HEIGHT: 75 IN | WEIGHT: 228 LBS | SYSTOLIC BLOOD PRESSURE: 135 MMHG | DIASTOLIC BLOOD PRESSURE: 86 MMHG | OXYGEN SATURATION: 98 % | BODY MASS INDEX: 28.35 KG/M2 | HEART RATE: 59 BPM

## 2024-09-18 DIAGNOSIS — Z86.69 HISTORY OF OBSTRUCTIVE SLEEP APNEA: Primary | ICD-10-CM

## 2024-09-18 PROCEDURE — 99213 OFFICE O/P EST LOW 20 MIN: CPT | Performed by: INTERNAL MEDICINE

## 2024-09-18 NOTE — NURSING NOTE
"Chief Complaint   Patient presents with    Study Results       Initial /86   Pulse 59   Ht 1.905 m (6' 3\")   Wt 103.4 kg (228 lb)   SpO2 98%   BMI 28.50 kg/m   Estimated body mass index is 28.5 kg/m  as calculated from the following:    Height as of this encounter: 1.905 m (6' 3\").    Weight as of this encounter: 103.4 kg (228 lb).    Medication Reconciliation: complete    Neck circumference: 16.15 inches / 41 centimeters.    Julienne Leon CMA on 9/18/2024 at 11:24 AM       "

## 2024-09-18 NOTE — LETTER
2024    Norma Garcia   1985        To Whom it May Concern;    Please excuse Norma Garcia from work for a healthcare visit on Sep 18, 2024. The patient's repeat sleep study performed on 2024 did not show he had obstructive sleep apnea. If you have any other questions or concerns, please feel free to call me.    Sincerely,        Franky Contreras, DO

## 2024-09-18 NOTE — LETTER
2024      Norma Garcia  [address] [place] [id number]       1985        To Whom it May Concern;    My patient Norma Garcia presented for  healthcare visit on Sep 18, 2024. The patient's repeat sleep study performed on 2024 did not show he had obstructive sleep apnea. If you have any other questions or concerns, please feel free to call me.    Sincerely,        Franky Contreras, DO

## 2024-09-18 NOTE — PROGRESS NOTES
"Additional 10 minutes on the date of service was spent performing the following:    -Preparing to see the patient  I informed the patient-Ordering medications, tests, or procedures   -Documenting clinical information in the electronic or other health record     Thank you for the opportunity to participate in the care of Norma Garcia.     He is a 39 year old  male patient who comes to the sleep medicine clinic for review of sleep study results. The patient had a sleep study performed on 08/13/2024 (AHI=2.4).    Assessment and Plan:  In summary Norma Garcia is a 39 year old year old male here for review of sleep study results.  1. History of obstructive sleep apnea  I informed the patient that based upon the results of her latest sleep study, he did not rule in for obstructive sleep apnea.  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  I welcomed the patient to follow-up with me on an as-needed basis.    ANGELLA:  ANGELLA Total Score: 5  Total score - [place]: 4 (9/18/2024 11:16 AM)    Patient Active Problem List   Diagnosis    CARDIOVASCULAR SCREENING; LDL GOAL LESS THAN 160    Psoriasis    Hypothyroidism due to Hashimoto's thyroiditis       Current Outpatient Medications   Medication Sig Dispense Refill    multivitamin w/minerals (THERA-VIT-M) tablet Take 1 tablet by mouth daily      thyroid (ARMOUR) 60 MG tablet Take 1 tablet (60 mg) by mouth daily 90 tablet 0    Vitamin D3 (CHOLECALCIFEROL) 125 MCG (5000 UT) tablet Take by mouth daily         Allergies   Allergen Reactions    No Known Drug Allergy        Physical Exam:  /86   Pulse 59   Ht 1.905 m (6' 3\")   Wt 103.4 kg (228 lb)   SpO2 98%   BMI 28.50 kg/m    BMI:Body mass index is 28.5 kg/m .   GEN: NAD,   Head: Normocephalic.  Psych: normal mood, normal affect     Labs/Studies:  - We reviewed the results of the overnight study as described on the HPI.     Patient " verbalized understanding of these issues, agrees with the plan and all questions were answered today. Patient was given an opportuntity to voice any other symptoms or concerns not listed above. Patient did not have any other symptoms or concerns.      Franky Contreras DO  Board Certified in Internal Medicine and Sleep Medicine      (Note created with Dragon voice recognition and unintended spelling errors and word substitutions may occur)

## 2024-11-17 ENCOUNTER — HOSPITAL ENCOUNTER (EMERGENCY)
Facility: CLINIC | Age: 39
Discharge: HOME | End: 2024-11-17
Attending: EMERGENCY MEDICINE | Admitting: EMERGENCY MEDICINE
Payer: COMMERCIAL

## 2024-11-17 ENCOUNTER — APPOINTMENT (OUTPATIENT)
Dept: GENERAL RADIOLOGY | Facility: CLINIC | Age: 39
End: 2024-11-17
Attending: EMERGENCY MEDICINE
Payer: COMMERCIAL

## 2024-11-17 VITALS
WEIGHT: 241.6 LBS | HEIGHT: 75 IN | BODY MASS INDEX: 30.04 KG/M2 | HEART RATE: 79 BPM | DIASTOLIC BLOOD PRESSURE: 95 MMHG | RESPIRATION RATE: 21 BRPM | OXYGEN SATURATION: 100 % | TEMPERATURE: 97.1 F | SYSTOLIC BLOOD PRESSURE: 141 MMHG

## 2024-11-17 DIAGNOSIS — R68.89 FEELING UNWELL: ICD-10-CM

## 2024-11-17 LAB
ALBUMIN SERPL BCG-MCNC: 4.7 G/DL (ref 3.5–5.2)
ALP SERPL-CCNC: 88 U/L (ref 40–150)
ALT SERPL W P-5'-P-CCNC: 47 U/L (ref 0–70)
ANION GAP SERPL CALCULATED.3IONS-SCNC: 13 MMOL/L (ref 7–15)
AST SERPL W P-5'-P-CCNC: 31 U/L (ref 0–45)
BASOPHILS # BLD AUTO: 0.1 10E3/UL (ref 0–0.2)
BASOPHILS NFR BLD AUTO: 1 %
BILIRUB SERPL-MCNC: 0.4 MG/DL
BUN SERPL-MCNC: 19.7 MG/DL (ref 6–20)
CALCIUM SERPL-MCNC: 9.4 MG/DL (ref 8.8–10.4)
CHLORIDE SERPL-SCNC: 102 MMOL/L (ref 98–107)
CREAT SERPL-MCNC: 1.01 MG/DL (ref 0.67–1.17)
DEPRECATED S PYO AG THROAT QL EIA: NEGATIVE
EGFRCR SERPLBLD CKD-EPI 2021: >90 ML/MIN/1.73M2
EOSINOPHIL # BLD AUTO: 0.1 10E3/UL (ref 0–0.7)
EOSINOPHIL NFR BLD AUTO: 2 %
ERYTHROCYTE [DISTWIDTH] IN BLOOD BY AUTOMATED COUNT: 12.6 % (ref 10–15)
FLUAV RNA SPEC QL NAA+PROBE: NEGATIVE
FLUBV RNA RESP QL NAA+PROBE: NEGATIVE
GLUCOSE SERPL-MCNC: 121 MG/DL (ref 70–99)
GROUP A STREP BY PCR: NOT DETECTED
HCO3 SERPL-SCNC: 22 MMOL/L (ref 22–29)
HCT VFR BLD AUTO: 43.3 % (ref 40–53)
HGB BLD-MCNC: 14.6 G/DL (ref 13.3–17.7)
IMM GRANULOCYTES # BLD: 0 10E3/UL
IMM GRANULOCYTES NFR BLD: 0 %
LYMPHOCYTES # BLD AUTO: 1.1 10E3/UL (ref 0.8–5.3)
LYMPHOCYTES NFR BLD AUTO: 19 %
MCH RBC QN AUTO: 29.9 PG (ref 26.5–33)
MCHC RBC AUTO-ENTMCNC: 33.7 G/DL (ref 31.5–36.5)
MCV RBC AUTO: 89 FL (ref 78–100)
MONOCYTES # BLD AUTO: 0.4 10E3/UL (ref 0–1.3)
MONOCYTES NFR BLD AUTO: 7 %
NEUTROPHILS # BLD AUTO: 4.1 10E3/UL (ref 1.6–8.3)
NEUTROPHILS NFR BLD AUTO: 71 %
NRBC # BLD AUTO: 0 10E3/UL
NRBC BLD AUTO-RTO: 0 /100
PLATELET # BLD AUTO: 246 10E3/UL (ref 150–450)
POTASSIUM SERPL-SCNC: 4.2 MMOL/L (ref 3.4–5.3)
PROT SERPL-MCNC: 7.8 G/DL (ref 6.4–8.3)
RBC # BLD AUTO: 4.89 10E6/UL (ref 4.4–5.9)
RSV RNA SPEC NAA+PROBE: NEGATIVE
SARS-COV-2 RNA RESP QL NAA+PROBE: NEGATIVE
SODIUM SERPL-SCNC: 137 MMOL/L (ref 135–145)
WBC # BLD AUTO: 5.8 10E3/UL (ref 4–11)

## 2024-11-17 PROCEDURE — 36415 COLL VENOUS BLD VENIPUNCTURE: CPT | Performed by: EMERGENCY MEDICINE

## 2024-11-17 PROCEDURE — 99283 EMERGENCY DEPT VISIT LOW MDM: CPT | Performed by: EMERGENCY MEDICINE

## 2024-11-17 PROCEDURE — 85004 AUTOMATED DIFF WBC COUNT: CPT | Performed by: EMERGENCY MEDICINE

## 2024-11-17 PROCEDURE — 99284 EMERGENCY DEPT VISIT MOD MDM: CPT | Mod: 25 | Performed by: EMERGENCY MEDICINE

## 2024-11-17 PROCEDURE — 80048 BASIC METABOLIC PNL TOTAL CA: CPT | Performed by: EMERGENCY MEDICINE

## 2024-11-17 PROCEDURE — 84075 ASSAY ALKALINE PHOSPHATASE: CPT | Performed by: EMERGENCY MEDICINE

## 2024-11-17 PROCEDURE — 71046 X-RAY EXAM CHEST 2 VIEWS: CPT

## 2024-11-17 PROCEDURE — 82310 ASSAY OF CALCIUM: CPT | Performed by: EMERGENCY MEDICINE

## 2024-11-17 PROCEDURE — 87637 SARSCOV2&INF A&B&RSV AMP PRB: CPT | Performed by: EMERGENCY MEDICINE

## 2024-11-17 PROCEDURE — 87651 STREP A DNA AMP PROBE: CPT | Performed by: EMERGENCY MEDICINE

## 2024-11-17 ASSESSMENT — COLUMBIA-SUICIDE SEVERITY RATING SCALE - C-SSRS
1. IN THE PAST MONTH, HAVE YOU WISHED YOU WERE DEAD OR WISHED YOU COULD GO TO SLEEP AND NOT WAKE UP?: NO
6. HAVE YOU EVER DONE ANYTHING, STARTED TO DO ANYTHING, OR PREPARED TO DO ANYTHING TO END YOUR LIFE?: NO
2. HAVE YOU ACTUALLY HAD ANY THOUGHTS OF KILLING YOURSELF IN THE PAST MONTH?: NO

## 2024-11-17 ASSESSMENT — ACTIVITIES OF DAILY LIVING (ADL)
ADLS_ACUITY_SCORE: 0
ADLS_ACUITY_SCORE: 0

## 2024-11-17 NOTE — DISCHARGE INSTRUCTIONS
Your labs were basically normal except for slightly high blood sugar.  You can have this rechecked in clinic.  I recommend making a primary care appointment.    Your chest x-ray was clear.    The COVID and strep swabs were negative.    It is possible that you are coming down with another viral illness that is not on the swabs.  In any case, get some rest, stay well-hydrated.  Follow-up in clinic.  Return for worsening, changes or concerns.    I hope that you start to feel much better quickly!!

## 2024-11-17 NOTE — ED TRIAGE NOTES
PT presents with c/o off and on Chest and rib cage pain for the last couple weeks. PT also with reports that he feels fatigued and some sore throat for 2 weeks, but is worse today. No cough. Denies any Chest pain or SOB on triage.

## 2024-11-18 NOTE — ED PROVIDER NOTES
"  History     Chief Complaint   Patient presents with    Pharyngitis    Fatigue    Chest Wall Pain     HPI  History per patient, medical records    This is a 39-year-old male, history of hypothyroidism, psoriasis presenting with sore throat, fatigue, chest wall pain.  Patient started having symptoms 2 weeks ago.  He has had a cough and runny nose with phlegm production.  He has been very fatigued.  Today he feels like he is \"in a fog\".  He has felt hot and cold without specific fever.  Today he has absolutely no appetite.  He also notes headache.  Yesterday he had a sore throat.  He notes sinus drainage, no ear pain or pressure.  He has had nausea without vomiting or diarrhea.  No rash.  No obvious exposures.  He denies any specific chest or abdominal pain.  He states that his wife has told him for the last 4 days that he \"just does not look right\".    Allergies:  Allergies   Allergen Reactions    No Known Drug Allergy        Problem List:    Patient Active Problem List    Diagnosis Date Noted    Hypothyroidism due to Hashimoto's thyroiditis 04/03/2013     Priority: Medium    Psoriasis 03/30/2012     Priority: Medium    CARDIOVASCULAR SCREENING; LDL GOAL LESS THAN 160 10/31/2010     Priority: Medium        Past Medical History:    Past Medical History:   Diagnosis Date    Psoriasis     Thyroid disease        Past Surgical History:    Past Surgical History:   Procedure Laterality Date    NO HISTORY OF SURGERY         Family History:    Family History   Problem Relation Age of Onset    Sleep Apnea Father     Diabetes Maternal Grandmother     Neurologic Disorder Paternal Grandmother         migraine headaches    Breast Cancer Paternal Grandmother     Breast Cancer Paternal Aunt        Social History:  Marital Status:   [2]  Social History     Tobacco Use    Smoking status: Never     Passive exposure: Never    Smokeless tobacco: Never   Vaping Use    Vaping status: Never Used   Substance Use Topics    Alcohol " "use: Not Currently    Drug use: No        Medications:    multivitamin w/minerals (THERA-VIT-M) tablet  thyroid (ARMOUR) 60 MG tablet  Vitamin D3 (CHOLECALCIFEROL) 125 MCG (5000 UT) tablet          Review of Systems  All other ROS reviewed and are negative or non-contributory except as stated in HPI.     Physical Exam   BP: (!) 168/108  Pulse: 71  Temp: 97.1  F (36.2  C)  Resp: 21  Height: 190.5 cm (6' 3\")  Weight: 109.6 kg (241 lb 9.6 oz)  SpO2: 100 %      Physical Exam  Vitals and nursing note reviewed.   Constitutional:       Appearance: Normal appearance. He is obese.      Comments: Tired but healthy-appearing male lying in the bed   HENT:      Head: Normocephalic.      Right Ear: Tympanic membrane and ear canal normal.      Left Ear: Tympanic membrane and ear canal normal.      Nose: Nose normal.      Mouth/Throat:      Mouth: Mucous membranes are moist.      Pharynx: Oropharynx is clear.   Eyes:      General: No scleral icterus.     Extraocular Movements: Extraocular movements intact.      Conjunctiva/sclera: Conjunctivae normal.      Pupils: Pupils are equal, round, and reactive to light.   Cardiovascular:      Rate and Rhythm: Normal rate and regular rhythm.      Pulses: Normal pulses.      Heart sounds: Normal heart sounds.   Pulmonary:      Effort: Pulmonary effort is normal.      Breath sounds: Normal breath sounds.   Abdominal:      Palpations: Abdomen is soft.      Tenderness: There is no abdominal tenderness.   Musculoskeletal:         General: Normal range of motion.      Cervical back: Normal range of motion and neck supple.      Right lower leg: No edema.      Left lower leg: No edema.   Skin:     General: Skin is warm and dry.   Neurological:      General: No focal deficit present.      Mental Status: He is alert and oriented to person, place, and time.   Psychiatric:         Behavior: Behavior normal.      Comments: Mildly anxious         ED Course (with Medical Decision Making)    Pt seen and " examined by me.  RN and EPIC notes reviewed.       Patient presenting with fatigue, generally feeling unwell.  He may just be finding some viral illness.  Nothing specific noted except for some mild hypertension on exam.  We talked about options.  I checked some basic labs and strep and COVID/influenza.    Strep negative  Flu/RSV/COVID-negative  CMP normal except for mildly elevated glucose at 121  CBC normal  Chest x-ray was checked and is clear.    I recommend patient continue to monitor symptoms, get plenty of rest, drink lots of fluids.  Follow-up in clinic if not improving.  Return for significant worsening, changes or concerns.        Procedures    Results for orders placed or performed during the hospital encounter of 11/17/24 (from the past 24 hours)   Streptococcus A Rapid Scr w Reflx to PCR    Specimen: Throat; Swab   Result Value Ref Range    Group A Strep antigen Negative Negative   Influenza A/B, RSV, & SARS-CoV2 PCR (COVID-19) Nose    Specimen: Nose; Swab   Result Value Ref Range    Influenza A PCR Negative Negative    Influenza B PCR Negative Negative    RSV PCR Negative Negative    SARS CoV2 PCR Negative Negative    Narrative    Testing was performed using the Xpert Xpress CoV2/Flu/RSV Assay on the Cepheid GeneXpert Instrument. This test should be ordered for the detection of SARS-CoV2, influenza, and RSV viruses in individuals with signs and symptoms of respiratory tract infection. This test is for in vitro diagnostic use under the [place] FDA for laboratories certified under CLIA to perform high or moderate complexity testing. This test has been [place] FDA cleared. A negative result does not rule out the presence of PCR inhibitors in the specimen or target RNA in concentration below the limit of detection for the assay. If only one viral target is positive but coinfection with multiple targets is suspected, the sample should be re-tested with another FDA cleared, approved, or authorized test, if  coninfection would change clinical management. This test was validated by the [facility]. These laboratories are certified under the Clinical Laboratory Improvement Amendments of 1988 (CLIA-88) as qualified to perfom high complexity laboratory testing.   Group A Streptococcus PCR Throat Swab    Specimen: Throat; Swab   Result Value Ref Range    Group A strep by PCR Not Detected Not Detected    Narrative    The Xpert Xpress Strep A test, performed on the [employer] Systems, is a rapid, qualitative in vitro diagnostic test for the detection of Streptococcus pyogenes (Group A ß-hemolytic Streptococcus, Strep A) in throat swab specimens from patients with signs and symptoms of pharyngitis. The Xpert Xpress Strep A test can be used as an aid in the diagnosis of Group A Streptococcal pharyngitis. The assay is not intended to monitor treatment for Group A Streptococcus infections. The Xpert Xpress Strep A test utilizes an automated real-time polymerase chain reaction (PCR) to detect Streptococcus pyogenes DNA.   Chest XR,  PA & LAT    Narrative    EXAM: XR CHEST 2 VIEWS  LOCATION: [facility]  DATE: 11/17/2024    INDICATION: cough, sob  COMPARISON: 10/19/2019      Impression    IMPRESSION: Heart is normal in size. Lungs are clear.   CBC with platelets differential    Narrative    The following orders were created for panel order CBC with platelets differential.  Procedure                               Abnormality         Status                     ---------                               -----------         ------                     CBC with platelets and d...[[id number]]                      Final result                 Please view results for these tests on the individual orders.   Comprehensive metabolic panel   Result Value Ref Range    Sodium 137 135 - 145 mmol/L    Potassium 4.2 3.4 - 5.3 mmol/L    Carbon Dioxide (CO2) 22 22 - 29 mmol/L    Anion Gap 13 7 -  15 mmol/L    Urea Nitrogen 19.7 6.0 - 20.0 mg/dL    Creatinine 1.01 0.67 - 1.17 mg/dL    GFR Estimate >90 >60 mL/min/1.73m2    Calcium 9.4 8.8 - 10.4 mg/dL    Chloride 102 98 - 107 mmol/L    Glucose 121 (H) 70 - 99 mg/dL    Alkaline Phosphatase 88 40 - 150 U/L    AST 31 0 - 45 U/L    ALT 47 0 - 70 U/L    Protein Total 7.8 6.4 - 8.3 g/dL    Albumin 4.7 3.5 - 5.2 g/dL    Bilirubin Total 0.4 <=1.2 mg/dL   CBC with platelets and differential   Result Value Ref Range    WBC Count 5.8 4.0 - 11.0 10e3/uL    RBC Count 4.89 4.40 - 5.90 10e6/uL    Hemoglobin 14.6 13.3 - 17.7 g/dL    Hematocrit 43.3 40.0 - 53.0 %    MCV 89 78 - 100 fL    MCH 29.9 26.5 - 33.0 pg    MCHC 33.7 31.5 - 36.5 g/dL    RDW 12.6 10.0 - 15.0 %    Platelet Count 246 150 - 450 10e3/uL    % Neutrophils 71 %    % Lymphocytes 19 %    % Monocytes 7 %    % Eosinophils 2 %    % Basophils 1 %    % Immature Granulocytes 0 %    NRBCs per 100 WBC 0 <1 /100    Absolute Neutrophils 4.1 1.6 - 8.3 10e3/uL    Absolute Lymphocytes 1.1 0.8 - 5.3 10e3/uL    Absolute Monocytes 0.4 0.0 - 1.3 10e3/uL    Absolute Eosinophils 0.1 0.0 - 0.7 10e3/uL    Absolute Basophils 0.1 0.0 - 0.2 10e3/uL    Absolute Immature Granulocytes 0.0 <=0.4 10e3/uL    Absolute NRBCs 0.0 10e3/uL       Medications - No data to display    Assessments & Plan     I have reviewed the findings, diagnosis, plan and need for follow up with the patient.    Discharge Medication List as of 11/17/2024 10:42 AM          Final diagnoses:   Feeling unwell     Disposition: Patient discharged home in stable condition.  Plan as above.  Return for concerns.     Note: Chart documentation done in part with Dragon Voice Recognition software. Although reviewed after completion, some word and grammatical errors may remain.   11/17/2024   [facility] EMERGENCY DEPT       Roxana Candelario MD  11/18/24 [id number]

## 2025-08-16 ENCOUNTER — HEALTH MAINTENANCE LETTER (OUTPATIENT)
Age: 40
End: 2025-08-16